# Patient Record
Sex: FEMALE | Race: WHITE | NOT HISPANIC OR LATINO | Employment: FULL TIME | URBAN - METROPOLITAN AREA
[De-identification: names, ages, dates, MRNs, and addresses within clinical notes are randomized per-mention and may not be internally consistent; named-entity substitution may affect disease eponyms.]

---

## 2020-10-21 PROCEDURE — U0003 INFECTIOUS AGENT DETECTION BY NUCLEIC ACID (DNA OR RNA); SEVERE ACUTE RESPIRATORY SYNDROME CORONAVIRUS 2 (SARS-COV-2) (CORONAVIRUS DISEASE [COVID-19]), AMPLIFIED PROBE TECHNIQUE, MAKING USE OF HIGH THROUGHPUT TECHNOLOGIES AS DESCRIBED BY CMS-2020-01-R: HCPCS | Performed by: FAMILY MEDICINE

## 2021-10-29 ENCOUNTER — OFFICE VISIT (OUTPATIENT)
Dept: INTERNAL MEDICINE | Facility: CLINIC | Age: 36
End: 2021-10-29

## 2021-10-29 VITALS
BODY MASS INDEX: 30.62 KG/M2 | DIASTOLIC BLOOD PRESSURE: 76 MMHG | TEMPERATURE: 97.2 F | WEIGHT: 183.8 LBS | HEART RATE: 80 BPM | HEIGHT: 65 IN | OXYGEN SATURATION: 98 % | SYSTOLIC BLOOD PRESSURE: 118 MMHG

## 2021-10-29 DIAGNOSIS — D64.89 ANEMIA DUE TO OTHER CAUSE, NOT CLASSIFIED: ICD-10-CM

## 2021-10-29 DIAGNOSIS — K50.00 CROHN'S DISEASE OF SMALL INTESTINE WITHOUT COMPLICATION (HCC): Primary | ICD-10-CM

## 2021-10-29 DIAGNOSIS — Z84.81 FAMILY HISTORY OF BRCA GENE POSITIVE: ICD-10-CM

## 2021-10-29 DIAGNOSIS — M62.08 DIASTASIS RECTI: ICD-10-CM

## 2021-10-29 PROCEDURE — 99214 OFFICE O/P EST MOD 30 MIN: CPT | Performed by: STUDENT IN AN ORGANIZED HEALTH CARE EDUCATION/TRAINING PROGRAM

## 2021-10-29 RX ORDER — BUSPIRONE HYDROCHLORIDE 5 MG/1
10 TABLET ORAL 2 TIMES DAILY
Qty: 60 TABLET | Refills: 1 | Status: CANCELLED | OUTPATIENT
Start: 2021-10-29

## 2021-10-29 NOTE — PROGRESS NOTES
New Patient Office Visit      Date: 10/29/2021      Patient Name: Maria Luisa Quintero  : 1985   MRN: [id number]     Chief Complaint:    Chief Complaint   Patient presents with   • Establish Care   • Anemia   • Anxiety   • Abdominal Pain       History of Present Illness: Maria Luisa Quintero is a 35 y.o. female who presents to clinic today to establish care.  She gave birth to twin daughters about 15 weeks ago.  Their names are Vita and Omayra.  She had an uncomplicated pregnancy and required a  for delivery.  She developed preeclampsia postpartum that was treated by OB/GYN and was discharged from their clinic.  She had no issues with hypertension prior to pregnancy.  She denies gestational diabetes.  She has following with a pelvic floor physical therapist for diastases recti.  She was told it could take up to a year to resolve and she plans to have corrective surgery at that time.  She has multiple family members who have had breast cancer and that are BRCA positive including her mother and aunts.  Her sister is BRCA positive.  She receives annual mammograms and has an ultrasound scheduled soon since she is breast-feeding.  She has a history of Crohn's disease that is an active.  Her last colonoscopy was about 5 years ago and she has not seen a GI physician recently and is requesting a referral to Dr. Horton [facility] at .  She denies diarrhea, mucus in stool, blood in stool.    Subjective     Review of System: Review of Systems   Constitutional: Negative for fatigue and unexpected weight change.   HENT: Negative for congestion, hearing loss, postnasal drip, rhinorrhea and sore throat.    Eyes: Negative for visual disturbance.   Respiratory: Negative for cough and shortness of breath.    Cardiovascular: Negative for chest pain, palpitations and leg swelling.   Gastrointestinal: Positive for abdominal distention and abdominal pain. Negative for blood in stool, constipation, diarrhea, nausea and  "vomiting.   Endocrine: Negative for cold intolerance, heat intolerance, polydipsia and polyuria.   Genitourinary: Negative for difficulty urinating, dysuria, frequency, hematuria and urgency.   Musculoskeletal: Negative for arthralgias, back pain, joint swelling and myalgias.   Skin: Negative for rash and wound.   Allergic/Immunologic: Negative for environmental allergies.   Neurological: Negative for dizziness, syncope, weakness, numbness and headaches.   Hematological: Does not bruise/bleed easily.   Psychiatric/Behavioral: Negative for agitation, decreased concentration and sleep disturbance. The patient is not nervous/anxious.       I have reviewed the ROS documented by my clinical staff, updated appropriately and I agree. Krista Sims MD    Past Medical History:   Past Medical History:   Diagnosis Date   • Crohn's colitis (HCC)        Past Surgical History:   Past Surgical History:   Procedure Laterality Date   • ABSCESS DRAINAGE     • DENTAL PROCEDURE         Family History: History reviewed. No pertinent family history.    Social History:   Social History     Socioeconomic History   • Marital status: Single   Tobacco Use   • Smoking status: Never Smoker   • Smokeless tobacco: Never Used   Substance and Sexual Activity   • Alcohol use: Never   • Drug use: Never       Medications:     Current Outpatient Medications:   •  prenatal vitamin (prenatal, CLASSIC, vitamin) tablet, Take  by mouth Daily., Disp: , Rfl:     Allergies:   Allergies   Allergen Reactions   • Humira [Adalimumab] Other (See Comments)     Drug induced lupus   • Remicade [Infliximab] Other (See Comments)     Drug induced lupus       Objective     Physical Exam:   Vital Signs:   Vitals:    10/29/21 1348   BP: 118/76   Pulse: 80   Temp: 97.2 °F (36.2 °C)   SpO2: 98%   Weight: 83.4 kg (183 lb 12.8 oz)   Height: 165.1 cm (65\")   PainSc: 0-No pain     Body mass index is 30.59 kg/m².     Physical Exam  Vitals and nursing note reviewed. "   Constitutional:       Appearance: Normal appearance.   HENT:      Head: Normocephalic and atraumatic.   Cardiovascular:      Rate and Rhythm: Normal rate and regular rhythm.      Heart sounds: Normal heart sounds.   Pulmonary:      Effort: Pulmonary effort is normal.      Breath sounds: Normal breath sounds. No wheezing, rhonchi or rales.   Abdominal:      General: Abdomen is flat. Bowel sounds are normal.      Palpations: Abdomen is soft.      Tenderness: There is no abdominal tenderness. There is no guarding.      Hernia: No hernia is present.      Comments: Diastasis recti present   Musculoskeletal:         General: Normal range of motion.   Skin:     General: Skin is warm and dry.   Neurological:      Mental Status: She is alert.   Psychiatric:         Mood and Affect: Mood normal.         Behavior: Behavior normal.         Assessment / Plan      Assessment/Plan:   Diagnoses and all orders for this visit:    1. Crohn's disease of small intestine without complication (HCC) (Primary)  Ambulatory Referral to Gastroenterology. Patient specifically requested Dr. Horton at .  Has not had a colonoscopy in 5 years. Disease has been inactive. CMP and CBC ordered today.     2. Anemia due to other cause, not classified  CBC and ferritin ordered today.  Previously on iron supplementation.     3. Pre-eclampsia, postpartum  Managed by OBGYN postpartum. Discharged from their clinic. Remains normotensive. Will assess renal and liver function today.     4. Family history of BRCA gene positive  Requesting BRCA testing. Ambulatory Referral to Genetics. Breast imaging via ultrasound scheduled soon since she is breast feeding.     5. Diastasis recti  Continue following with pelvic floor physical therapy.     Follow Up:   Return in about 6 months (around 4/29/2022) for Annual.    Time:   I spent approximately 35 minutes providing clinical care for this patient; including review of patient's chart and provider documentation,  face to face time spent with patient in examination room (obtaining history, performing physical exam, discussing diagnosis and management options), placing orders, and completing patient documentation.     2 chronic stable conditions assessed and multiple unique lab tests ordered consistent with moderate MDM.    Krista Sims MD  [facility] [place] [facility]

## 2022-03-07 ENCOUNTER — TRANSCRIBE ORDERS (OUTPATIENT)
Dept: ADMINISTRATIVE | Facility: HOSPITAL | Age: 37
End: 2022-03-07

## 2022-03-07 ENCOUNTER — TELEPHONE (OUTPATIENT)
Dept: INTERNAL MEDICINE | Facility: CLINIC | Age: 37
End: 2022-03-07

## 2022-03-07 DIAGNOSIS — Z12.31 VISIT FOR SCREENING MAMMOGRAM: Primary | ICD-10-CM

## 2022-03-07 NOTE — TELEPHONE ENCOUNTER
Caller: Maria Luisa Quintero    Relationship: Self    Best call back number: [phone]    What orders are you requesting (i.e. lab or imaging):  MAMMOGRAM     In what timeframe would the patient need to come in: 03/07/2022    Where will you receive your lab/imaging services:   [facility]     Additional notes:   PATIENT STATED THAT SHE WOULD LIKE FOR A ORDER FOR A MAMMOGRAM DUE TO BE OVER DUE

## 2022-03-08 DIAGNOSIS — Z12.31 ENCOUNTER FOR SCREENING MAMMOGRAM FOR MALIGNANT NEOPLASM OF BREAST: Primary | ICD-10-CM

## 2022-03-08 NOTE — TELEPHONE ENCOUNTER
Spoke with pt notified her mammogram order has been placed and her needs to call them and get an kimberly. Office number given.

## 2022-04-12 ENCOUNTER — TELEPHONE (OUTPATIENT)
Dept: INTERNAL MEDICINE | Facility: CLINIC | Age: 37
End: 2022-04-12

## 2022-04-12 NOTE — TELEPHONE ENCOUNTER
Spoke with pt's mother if they didn't get any call for making an kimberly they can call them to set an kimberly. Breast Center phone number given.

## 2022-04-12 NOTE — TELEPHONE ENCOUNTER
ANDREWS FROM [facility] IN [place] CALLED STATING PT IS COMING IN Monday, 04/18/2022.      SHE STATED PT NEEDS A SIGNED ORDER, AS SHE IS UNDER 40 AND THEY ARE REQUIRED TO HAVE ONE IN THE CHART.    PLEASE ADVISE.       CALL BACK : [phone]

## 2022-04-12 NOTE — TELEPHONE ENCOUNTER
Hub staff attempted to follow warm transfer process and was unsuccessful     Caller: Maria Luisa Quintero    Relationship to patient: Self    Best call back number:[phone]    Patient is needing: PATIENT RETURNED A CALL FROM THE OFFICE

## 2022-04-12 NOTE — TELEPHONE ENCOUNTER
PT RETURNING MISSED CALL.  PT HAS MAMMO SCHEDULED, BUT IS STILL WAITING FOR THE GENETICS TO CALL AND SCHEDULE.

## 2022-04-14 NOTE — TELEPHONE ENCOUNTER
Returned pt's call; gave pt # to  Genetics; did advise this referral can take up to 6 months to schedule due to large volume of ppl needing to be scheduled.

## 2022-04-15 DIAGNOSIS — Z91.89 AT HIGH RISK FOR BREAST CANCER: Primary | ICD-10-CM

## 2022-04-15 DIAGNOSIS — Z80.3 FAMILY HISTORY OF BREAST CANCER: ICD-10-CM

## 2022-04-15 DIAGNOSIS — Z12.31 ENCOUNTER FOR SCREENING MAMMOGRAM FOR MALIGNANT NEOPLASM OF BREAST: ICD-10-CM

## 2022-04-18 ENCOUNTER — APPOINTMENT (OUTPATIENT)
Dept: OTHER | Facility: HOSPITAL | Age: 37
End: 2022-04-18

## 2022-04-18 ENCOUNTER — HOSPITAL ENCOUNTER (OUTPATIENT)
Dept: MAMMOGRAPHY | Facility: HOSPITAL | Age: 37
Discharge: HOME | End: 2022-04-18
Admitting: STUDENT IN AN ORGANIZED HEALTH CARE EDUCATION/TRAINING PROGRAM

## 2022-04-18 DIAGNOSIS — Z12.31 VISIT FOR SCREENING MAMMOGRAM: ICD-10-CM

## 2022-04-18 DIAGNOSIS — Z92.89 H/O MAMMOGRAM: ICD-10-CM

## 2022-04-18 PROCEDURE — 77067 SCR MAMMO BI INCL CAD: CPT | Performed by: RADIOLOGY

## 2022-04-18 PROCEDURE — 77063 BREAST TOMOSYNTHESIS BI: CPT

## 2022-04-18 PROCEDURE — 77067 SCR MAMMO BI INCL CAD: CPT

## 2022-04-18 PROCEDURE — 77063 BREAST TOMOSYNTHESIS BI: CPT | Performed by: RADIOLOGY

## 2022-05-24 ENCOUNTER — TELEPHONE (OUTPATIENT)
Dept: INTERNAL MEDICINE | Facility: CLINIC | Age: 37
End: 2022-05-24

## 2022-05-24 DIAGNOSIS — Z12.11 COLON CANCER SCREENING: Primary | ICD-10-CM

## 2022-08-04 ENCOUNTER — OFFICE VISIT (OUTPATIENT)
Dept: GASTROENTEROLOGY | Facility: CLINIC | Age: 37
End: 2022-08-04

## 2022-08-04 VITALS
BODY MASS INDEX: 31.09 KG/M2 | WEIGHT: 186.6 LBS | DIASTOLIC BLOOD PRESSURE: 78 MMHG | TEMPERATURE: 97.8 F | HEART RATE: 82 BPM | SYSTOLIC BLOOD PRESSURE: 122 MMHG | OXYGEN SATURATION: 98 % | HEIGHT: 65 IN

## 2022-08-04 DIAGNOSIS — K50.919 CROHN'S DISEASE WITH COMPLICATION, UNSPECIFIED GASTROINTESTINAL TRACT LOCATION: Primary | ICD-10-CM

## 2022-08-04 PROCEDURE — 99204 OFFICE O/P NEW MOD 45 MIN: CPT | Performed by: PHYSICIAN ASSISTANT

## 2022-08-04 NOTE — PROGRESS NOTES
New Patient Consultation     Patient Name: Maria Luisa Quintero  : 1985   MRN: [id number]     Chief Complaint:    Chief Complaint   Patient presents with   • Crohn's Disease       History of Present Illness: Maria Luisa Quintero is a 36 y.o. female, PMH includes significant FHx of BRCA(+), who is here today for a Gastroenterology Consultation for Crohn's disease.     Pt states that she was diagnosed in  due to weight loss, abdominal pain, diarrhea and rectal bleeding. She also had two perirectal abscess that required surgical drainage. She was started on humira and remicade each for about 1 month but developed drug-induced lupus and these were stopped. She has had good response to Asacol in the past. She took a prednisone course for about a week one month ago due to polyarthralgias (hands, shoulder, knees, ankle).     Otherwise, pt states that she is feeling well from a GI standpoint. She generally has 1-2 large, soft BM per day and denies abdominal pain or rectal bleeding. She avoids pork and popcorn, which exacerbate GI sx. Patient denies associated fever, chills, indigestion, nausea, vomiting, diarrhea, constipation, hematemesis, dysphagia, hematochezia, melena, bloating, weight loss or gain, dysuria, jaundice or bruising.    Patient denies personal or FHx of PUD, H Pylori, gastritis, pancreatitis, colitis, Celiac disease, UC, IBS, colon or gastric cancers. Pt denies tobacco, illicit substance use. 2-3 EtOH drinks per week. NSAID few days per week for headaches.     EGD / CSY about 5 years ago at [facility] with Dr. Debbie Kent. Reported mucosal remission of Crohn's disease.     Subjective      Review of Systems:   Review of Systems   Constitutional: Negative for appetite change, chills, diaphoresis, fatigue, fever, unexpected weight gain and unexpected weight loss.   HENT: Negative for drooling, facial swelling, mouth sores, rhinorrhea, sore throat, tinnitus, trouble swallowing and voice change.     Eyes: Negative.    Respiratory: Negative for cough, chest tightness and shortness of breath.    Cardiovascular: Negative for chest pain.   Gastrointestinal: Negative for abdominal pain, blood in stool, constipation, diarrhea, nausea, vomiting, GERD and indigestion.   Genitourinary: Negative for dysuria, flank pain, hematuria and pelvic pain.   Musculoskeletal: Positive for arthralgias. Negative for myalgias.   Skin: Negative for color change, pallor and rash.   Neurological: Negative for dizziness, tremors, syncope, weakness and numbness.   Psychiatric/Behavioral: Negative for hallucinations and sleep disturbance. The patient is not nervous/anxious.    All other systems reviewed and are negative.      Past Medical History:   Past Medical History:   Diagnosis Date   • Crohn's colitis (HCC)        Past Surgical History:   Past Surgical History:   Procedure Laterality Date   • ABSCESS DRAINAGE     • DENTAL PROCEDURE         Family History:   Family History   Problem Relation Age of Onset   • Breast cancer Mother 36        POSITIVE FOR BRCA GENE   • Breast cancer Maternal Aunt 28        DX 2ND TIME; DECSD AGE 32   • Breast cancer Maternal Aunt         DX AGE LATE 40'S   • Breast cancer Maternal Aunt         DX AGE UNKNOWN   • Ovarian cancer Neg Hx        Social History:   Social History     Socioeconomic History   • Marital status: Single   Tobacco Use   • Smoking status: Never Smoker   • Smokeless tobacco: Never Used   Substance and Sexual Activity   • Alcohol use: Never   • Drug use: Never       Alcohol/Tobacco History:   Social History     Substance and Sexual Activity   Alcohol Use Never     Social History     Tobacco Use   Smoking Status Never Smoker   Smokeless Tobacco Never Used       Medications:     Current Outpatient Medications:   •  prenatal vitamin (prenatal, CLASSIC, vitamin) tablet, Take  by mouth Daily., Disp: , Rfl:     Allergies:   Allergies   Allergen Reactions   • Humira [Adalimumab] Other (See  Comments)     Drug induced lupus   • Remicade [Infliximab] Other (See Comments)     Drug induced lupus       Objective     Physical Exam:  Vital Signs: There were no vitals filed for this visit.  There is no height or weight on file to calculate BMI.     Physical Exam  Vitals and nursing note reviewed.   Constitutional:       Appearance: Normal appearance. She is normal weight. She is not ill-appearing or diaphoretic.      Comments: Pleasantly conversant   HENT:      Head: Normocephalic and atraumatic.      Right Ear: External ear normal.      Left Ear: External ear normal.      Nose: Nose normal. No rhinorrhea.      Mouth/Throat:      Mouth: Mucous membranes are moist.      Pharynx: Oropharynx is clear. No posterior oropharyngeal erythema.   Eyes:      General:         Right eye: No discharge.         Left eye: No discharge.      Conjunctiva/sclera: Conjunctivae normal.      Pupils: Pupils are equal, round, and reactive to light.   Neck:      Thyroid: No thyromegaly.   Cardiovascular:      Rate and Rhythm: Normal rate and regular rhythm.      Pulses: Normal pulses.      Heart sounds: Normal heart sounds. No murmur heard.  Pulmonary:      Effort: Pulmonary effort is normal.      Breath sounds: Normal breath sounds. No wheezing.   Abdominal:      General: Abdomen is flat. Bowel sounds are normal. There is no distension.      Tenderness: There is no abdominal tenderness. There is no guarding or rebound.   Musculoskeletal:         General: No tenderness. Normal range of motion.      Cervical back: Normal range of motion and neck supple.      Right lower leg: No edema.      Left lower leg: No edema.   Skin:     General: Skin is warm and dry.      Capillary Refill: Capillary refill takes less than 2 seconds.      Coloration: Skin is not jaundiced.      Findings: No bruising.   Neurological:      General: No focal deficit present.      Mental Status: She is oriented to person, place, and time.      Cranial Nerves: No  cranial nerve deficit.   Psychiatric:         Mood and Affect: Mood normal.         Thought Content: Thought content normal.         Judgment: Judgment normal.         Assessment / Plan      Assessment/Plan:   There are no diagnoses linked to this encounter.     Crohn's disease   - obtain records from Dr. Kent   - obtain CBC, CMP, CRP, vit D, vit B12, fecal calprotectin   - schedule for CSY   - follow up in clinic after completion of above studies   - call clinic at any time for questions or new / worsened sx    Follow Up:   Return in about 6 weeks (around 9/15/2022).    Plan of care reviewed with the patient at the conclusion of today's visit.  Education was provided regarding diagnosis, management, and any prescribed or recommended OTC medications.  Patient verbalized understanding of and agreement with management plan.     Time Statement:   Discussed plan of care in detail with patient today. Patient verbally understands and agrees. I have spent 45 minutes reviewing available diagnostics, obtaining history, examining the patient, developing a treatment plan, and educating the patient on disease process and plan of care.    Violeta Vivar PA-C   [facility]

## 2022-08-26 DIAGNOSIS — Z12.11 COLON CANCER SCREENING: Primary | ICD-10-CM

## 2022-08-26 RX ORDER — SODIUM, POTASSIUM,MAG SULFATES 17.5-3.13G
1 SOLUTION, RECONSTITUTED, ORAL ORAL TAKE AS DIRECTED
Qty: 354 ML | Refills: 0 | Status: SHIPPED | OUTPATIENT
Start: 2022-08-26 | End: 2022-09-23 | Stop reason: SDUPTHER

## 2022-09-13 ENCOUNTER — TELEPHONE (OUTPATIENT)
Dept: GASTROENTEROLOGY | Facility: OTHER | Age: 37
End: 2022-09-13

## 2022-09-13 NOTE — TELEPHONE ENCOUNTER
Patient has acute fever to 101.5 and URI symptoms. Had COVID 6 weeks ago and tested negative today.    >> Advised it is safest to reschedule her elective colonoscopy. Will have the office reach out to her to reschedule for a couple weeks from now.

## 2022-09-15 NOTE — TELEPHONE ENCOUNTER
Can you call this patient and get her procedure re-scheduled, as well as move her follow up with Sallie from 9/28?

## 2022-09-23 DIAGNOSIS — Z12.11 COLON CANCER SCREENING: ICD-10-CM

## 2022-09-23 RX ORDER — SODIUM, POTASSIUM,MAG SULFATES 17.5-3.13G
1 SOLUTION, RECONSTITUTED, ORAL ORAL TAKE AS DIRECTED
Qty: 354 ML | Refills: 0 | Status: SHIPPED | OUTPATIENT
Start: 2022-09-23

## 2022-09-28 ENCOUNTER — OUTSIDE FACILITY SERVICE (OUTPATIENT)
Dept: GASTROENTEROLOGY | Facility: CLINIC | Age: 37
End: 2022-09-28

## 2022-09-28 PROCEDURE — 88305 TISSUE EXAM BY PATHOLOGIST: CPT | Performed by: INTERNAL MEDICINE

## 2022-09-28 PROCEDURE — OUTSIDEPOS PR OUTSIDE POS PLACEHOLDER: Performed by: INTERNAL MEDICINE

## 2022-09-29 ENCOUNTER — LAB REQUISITION (OUTPATIENT)
Dept: LAB | Facility: HOSPITAL | Age: 37
End: 2022-09-29

## 2022-09-29 DIAGNOSIS — K64.8 OTHER HEMORRHOIDS: ICD-10-CM

## 2022-09-29 DIAGNOSIS — K50.919 CROHN'S DISEASE, UNSPECIFIED, WITH UNSPECIFIED COMPLICATIONS: ICD-10-CM

## 2022-09-29 DIAGNOSIS — R10.30 LOWER ABDOMINAL PAIN, UNSPECIFIED: ICD-10-CM

## 2022-09-29 DIAGNOSIS — D12.3 BENIGN NEOPLASM OF TRANSVERSE COLON: ICD-10-CM

## 2022-09-30 LAB
CYTO UR: NORMAL
LAB AP CASE REPORT: NORMAL
LAB AP CLINICAL INFORMATION: NORMAL
PATH REPORT.FINAL DX SPEC: NORMAL
PATH REPORT.GROSS SPEC: NORMAL

## 2022-10-18 ENCOUNTER — TELEPHONE (OUTPATIENT)
Dept: GASTROENTEROLOGY | Facility: CLINIC | Age: 37
End: 2022-10-18

## 2022-10-18 NOTE — TELEPHONE ENCOUNTER
Call 1x lvm to confirm upcoming appt   Patient (s)  given copy of dc instructions and 0 paper script(s) and 3 electronic scripts. Patient (s) verbalized understanding of instructions and script (s). Patient given a current medication reconciliation form and verbalized understanding of their medications. Patient (s) verbalized understanding of the importance of discussing medications with  his or her physician or clinic they will be following up with. Patient alert and oriented and in no acute distress. Patient offered wheelchair from treatment area to hospital entrance, patient denies wheelchair.

## 2023-07-26 ENCOUNTER — OFFICE VISIT (OUTPATIENT)
Dept: INTERNAL MEDICINE | Facility: CLINIC | Age: 38
End: 2023-07-26
Payer: COMMERCIAL

## 2023-07-26 VITALS
HEART RATE: 76 BPM | HEIGHT: 65 IN | OXYGEN SATURATION: 100 % | TEMPERATURE: 97.2 F | BODY MASS INDEX: 31.99 KG/M2 | WEIGHT: 192 LBS | SYSTOLIC BLOOD PRESSURE: 114 MMHG | DIASTOLIC BLOOD PRESSURE: 78 MMHG

## 2023-07-26 DIAGNOSIS — F41.9 ANXIETY: ICD-10-CM

## 2023-07-26 DIAGNOSIS — R63.5 WEIGHT GAIN: ICD-10-CM

## 2023-07-26 DIAGNOSIS — Z76.89 ENCOUNTER TO ESTABLISH CARE: Primary | ICD-10-CM

## 2023-07-26 DIAGNOSIS — K50.00 CROHN'S DISEASE OF SMALL INTESTINE WITHOUT COMPLICATION: ICD-10-CM

## 2023-07-26 DIAGNOSIS — Z13.29 SCREENING FOR HYPOTHYROIDISM: ICD-10-CM

## 2023-07-26 DIAGNOSIS — Z13.220 SCREENING FOR HYPERLIPIDEMIA: ICD-10-CM

## 2023-07-26 DIAGNOSIS — Z80.3 FAMILY HISTORY OF BREAST CANCER IN FEMALE: ICD-10-CM

## 2023-07-26 DIAGNOSIS — Z13.1 SCREENING FOR DIABETES MELLITUS (DM): ICD-10-CM

## 2023-07-26 DIAGNOSIS — J30.2 SEASONAL ALLERGIES: ICD-10-CM

## 2023-07-26 DIAGNOSIS — Z13.21 ENCOUNTER FOR VITAMIN DEFICIENCY SCREENING: ICD-10-CM

## 2023-07-26 DIAGNOSIS — Z80.3 FAMILY HISTORY OF BREAST CANCER IN FIRST DEGREE RELATIVE: ICD-10-CM

## 2023-07-26 RX ORDER — VENLAFAXINE HYDROCHLORIDE 37.5 MG/1
37.5 CAPSULE, EXTENDED RELEASE ORAL DAILY
Qty: 30 CAPSULE | Refills: 2 | Status: SHIPPED | OUTPATIENT
Start: 2023-07-26

## 2023-07-26 RX ORDER — IBUPROFEN 200 MG
TABLET ORAL
COMMUNITY

## 2023-07-26 RX ORDER — LEVOCETIRIZINE DIHYDROCHLORIDE 5 MG/1
TABLET, FILM COATED ORAL
COMMUNITY

## 2023-08-16 ENCOUNTER — OFFICE VISIT (OUTPATIENT)
Dept: INTERNAL MEDICINE | Facility: CLINIC | Age: 38
End: 2023-08-16
Payer: COMMERCIAL

## 2023-08-16 VITALS
WEIGHT: 187 LBS | BODY MASS INDEX: 31.16 KG/M2 | SYSTOLIC BLOOD PRESSURE: 112 MMHG | TEMPERATURE: 97.1 F | OXYGEN SATURATION: 99 % | DIASTOLIC BLOOD PRESSURE: 74 MMHG | HEIGHT: 65 IN | HEART RATE: 77 BPM

## 2023-08-16 DIAGNOSIS — Z09 FOLLOW-UP EXAM: Primary | ICD-10-CM

## 2023-08-16 DIAGNOSIS — F41.9 ANXIETY: ICD-10-CM

## 2023-08-16 DIAGNOSIS — Z80.3 FAMILY HISTORY OF BREAST CANCER: ICD-10-CM

## 2023-08-16 DIAGNOSIS — R63.5 WEIGHT GAIN: ICD-10-CM

## 2023-08-16 DIAGNOSIS — R10.30 LOWER ABDOMINAL PAIN: ICD-10-CM

## 2023-08-16 DIAGNOSIS — R79.89 LOW TSH LEVEL: ICD-10-CM

## 2023-08-16 NOTE — PROGRESS NOTES
Office Note     Name: Maria Luisa Quintero    : 1985     MRN: [id number]     Chief Complaint  Establish Care (Would like routine blood work. ), Anxiety (Would like to discuss starting a medication. ), and Weight Gain    Subjective     History of Present Illness:  Maria Luisa Quintero is a 37 y.o. female who presents today to establish care with a new provider.  Patient had previously been seen by Dr. Harrington in this clinic.  Patient does have Crohn's and follows with GI Dr. Ghosh.  Last colonoscopy was 3 years ago.  She reports she has been in remission for about 12 years now.  She does not currently take any medication for her Crohn's disease.  She had multiple abscesses related to her Crohn's about 13 years ago.  She also has a family history of breast cancer.  She would like to have the BRCA gene testing done.  She has noticed some weight gain over the past 2 weeks.  She feels that she runs and exercises regularly and has been eating more recently.  She also notes increased anxiety after having her twins.  She was previously placed on BuSpar which she did not like secondary to side effects.  She has noticed a lack of interest in activities she normally enjoys.  She denies any tobacco use.  She denies any recreational drug use.  She does use alcohol socially.  She would also like to have routine blood work done.  She denies further complaints or concerns at this time.      Past Medical History:   Diagnosis Date    Allergic     Hayfever    Crohn's colitis        Past Surgical History:   Procedure Laterality Date    ABSCESS DRAINAGE       SECTION  21    COLONOSCOPY  10/2022    DENTAL PROCEDURE         Social History     Socioeconomic History    Marital status: Single   Tobacco Use    Smoking status: Never    Smokeless tobacco: Never   Vaping Use    Vaping Use: Never used   Substance and Sexual Activity    Alcohol use: Yes     Alcohol/week: 3.0 standard drinks     Types: 3 Cans of beer per week  "    Comment: 3 drinks per week    Drug use: Never    Sexual activity: Yes     Partners: Male     Birth control/protection: Condom         Current Outpatient Medications:     ibuprofen (ADVIL,MOTRIN) 200 MG tablet, , Disp: , Rfl:     levocetirizine (XYZAL) 5 MG tablet, , Disp: , Rfl:     Multiple Vitamins-Minerals (MULTIVITAMIN ADULT, MINERALS, PO), , Disp: , Rfl:     Probiotic Product (Probiotic-10) chewable tablet, , Disp: , Rfl:     Pseudoephedrine HCl (SUDAFED 12 HOUR PO), , Disp: , Rfl:     venlafaxine XR (Effexor XR) 37.5 MG 24 hr capsule, Take 1 capsule by mouth Daily., Disp: 30 capsule, Rfl: 2    Objective     Vital Signs  /78   Pulse 76   Temp 97.2 øF (36.2 øC)   Ht 165.1 cm (65\")   Wt 87.1 kg (192 lb)   SpO2 100%   BMI 31.95 kg/mý   Estimated body mass index is 31.95 kg/mý as calculated from the following:    Height as of this encounter: 165.1 cm (65\").    Weight as of this encounter: 87.1 kg (192 lb).    BMI is >= 30 and <35. (Class 1 Obesity). The following options were offered after discussion;: exercise counseling/recommendations and nutrition counseling/recommendations      Physical Exam  Constitutional:       General: She is not in acute distress.     Appearance: Normal appearance. She is not ill-appearing.   HENT:      Head: Normocephalic and atraumatic.      Nose: Nose normal.   Eyes:      Extraocular Movements: Extraocular movements intact.      Conjunctiva/sclera: Conjunctivae normal.      Pupils: Pupils are equal, round, and reactive to light.   Cardiovascular:      Rate and Rhythm: Normal rate and regular rhythm.      Heart sounds: Normal heart sounds.   Pulmonary:      Effort: Pulmonary effort is normal. No respiratory distress.      Breath sounds: Normal breath sounds.   Musculoskeletal:         General: Normal range of motion.      Cervical back: Neck supple.   Skin:     General: Skin is warm and dry.   Neurological:      General: No focal deficit present.      Mental Status: She " is alert and oriented to person, place, and time. Mental status is at baseline.   Psychiatric:         Mood and Affect: Mood normal.         Behavior: Behavior normal.         Thought Content: Thought content normal.         Judgment: Judgment normal.        Assessment and Plan     Diagnoses and all orders for this visit:    1. Encounter to establish care (Primary)  -     CBC (No Diff); Future  -     Comprehensive metabolic panel; Future    2. Crohn's disease of small intestine without complication  -     CBC (No Diff); Future  -     Comprehensive metabolic panel; Future    3. Anxiety  -     venlafaxine XR (Effexor XR) 37.5 MG 24 hr capsule; Take 1 capsule by mouth Daily.  Dispense: 30 capsule; Refill: 2    4. Weight gain  -     TSH Rfx On Abnormal To Free T4; Future    5. BMI 32.0-32.9,adult    6. Seasonal allergies    7. Family history of breast cancer in female  -     BRCA1 Targeted Analysis - Blood,; Future  -     BRCA2 Targeted Analysis - Blood,; Future    8. Family history of breast cancer in first degree relative  -     BRCA1 Targeted Analysis - Blood,; Future  -     BRCA2 Targeted Analysis - Blood,; Future    9. Screening for hyperlipidemia  -     Lipid Panel; Future    10. Screening for hypothyroidism  -     TSH Rfx On Abnormal To Free T4; Future    11. Vitamin deficiency screen  -     Vitamin D,25-Hydroxy; Future  -     Vitamin B12; Future    12. Screening for diabetes mellitus (DM)  -     Urinalysis With Culture If Indicated -; Future  -     Hemoglobin A1c; Future    Plan:  Establish care.  Will trial Effexor.  Wellbutrin may also be an alternative to consider if intolerant to Effexor.  Lab orders placed.  Return to clinic in about 3 weeks for follow-up on anxiety and medication efficacy.    We will also review lab results at that time.  Return to clinic sooner if needed.  Further plan of care based on lab result findings.    Follow Up  Return in about 3 weeks (around 8/16/2023) for Recheck.    Karena  NICOLE Starks    Part of this note may be an electronic transcription/translation of spoken language to printed text using the Dragon Dictation System.

## 2023-08-29 DIAGNOSIS — Z80.3 FAMILY HISTORY OF BREAST CANCER IN FEMALE: Primary | ICD-10-CM

## 2023-08-29 DIAGNOSIS — Z80.3 FAMILY HISTORY OF BREAST CANCER IN FIRST DEGREE RELATIVE: ICD-10-CM

## 2023-09-02 PROBLEM — Z80.3 FAMILY HISTORY OF BREAST CANCER: Status: ACTIVE | Noted: 2023-09-02

## 2023-09-02 PROBLEM — R63.5 WEIGHT GAIN: Status: ACTIVE | Noted: 2023-09-02

## 2023-09-02 PROBLEM — F41.9 ANXIETY: Status: ACTIVE | Noted: 2023-09-02

## 2023-09-02 PROBLEM — R79.89 LOW TSH LEVEL: Status: ACTIVE | Noted: 2023-09-02

## 2023-09-02 NOTE — PROGRESS NOTES
Office Note     Name: Maria Luisa Quintero    : 1985     MRN: [id number]     Chief Complaint  Anxiety    Subjective     History of Present Illness:  Maria Luisa Quintero is a 37 y.o. female who presents today for follow-up visit on anxiety.  Patient is also here today for lab review.  Patient was started on Effexor 37.5 mg daily at the previous visit.  Patient reports she is tolerating this medication well.  She initially experienced a headache soon after starting the medication but feels this has been less frequent recently.  TSH level is low at 0.39, T4 level 0.9.  She reports TSH level was 0.54 back in 2019.  Hemoglobin was also noted to be low at 11.2.  Genetic testing for the BRCA gene pending.  Patient is interested in genetic counseling if positive.  She is considering a possible prophylactic mastectomy.  Patient does feel like the Effexor is working and she is tolerating this well.  She would like to stay on the current dose of this medication for now.  Patient also complains of some low abdominal pain and discomfort along with bloating.  She denies further complaints or concerns at this time.  Pleasant visit with the patient today.      Past Medical History:   Diagnosis Date    Allergic     Hayfever    Crohn's colitis        Past Surgical History:   Procedure Laterality Date    ABSCESS DRAINAGE       SECTION  21    COLONOSCOPY  10/2022    DENTAL PROCEDURE         Social History     Socioeconomic History    Marital status:    Tobacco Use    Smoking status: Never    Smokeless tobacco: Never   Vaping Use    Vaping Use: Never used   Substance and Sexual Activity    Alcohol use: Yes     Alcohol/week: 3.0 standard drinks     Types: 3 Cans of beer per week     Comment: 3 drinks per week    Drug use: Never    Sexual activity: Yes     Partners: Male     Birth control/protection: Condom         Current Outpatient Medications:     ibuprofen (ADVIL,MOTRIN) 200 MG tablet, , Disp: , Rfl:      "levocetirizine (XYZAL) 5 MG tablet, , Disp: , Rfl:     Multiple Vitamins-Minerals (MULTIVITAMIN ADULT, MINERALS, PO), , Disp: , Rfl:     Probiotic Product (Probiotic-10) chewable tablet, , Disp: , Rfl:     Pseudoephedrine HCl (SUDAFED 12 HOUR PO), , Disp: , Rfl:     venlafaxine XR (Effexor XR) 37.5 MG 24 hr capsule, Take 1 capsule by mouth Daily., Disp: 30 capsule, Rfl: 2    Objective     Vital Signs  /74   Pulse 77   Temp 97.1 øF (36.2 øC)   Ht 165.1 cm (65\")   Wt 84.8 kg (187 lb)   SpO2 99%   BMI 31.12 kg/mý   Estimated body mass index is 31.12 kg/mý as calculated from the following:    Height as of this encounter: 165.1 cm (65\").    Weight as of this encounter: 84.8 kg (187 lb).           Physical Exam  Constitutional:       General: She is not in acute distress.     Appearance: Normal appearance. She is not ill-appearing.   HENT:      Head: Normocephalic and atraumatic.      Nose: Nose normal.   Eyes:      Extraocular Movements: Extraocular movements intact.      Conjunctiva/sclera: Conjunctivae normal.      Pupils: Pupils are equal, round, and reactive to light.   Cardiovascular:      Rate and Rhythm: Normal rate and regular rhythm.      Heart sounds: Normal heart sounds.   Pulmonary:      Effort: Pulmonary effort is normal. No respiratory distress.      Breath sounds: Normal breath sounds.   Musculoskeletal:         General: Normal range of motion.      Cervical back: Neck supple.   Skin:     General: Skin is warm and dry.   Neurological:      General: No focal deficit present.      Mental Status: She is alert and oriented to person, place, and time. Mental status is at baseline.   Psychiatric:         Mood and Affect: Mood normal.         Behavior: Behavior normal.         Thought Content: Thought content normal.         Judgment: Judgment normal.        Assessment and Plan     Diagnoses and all orders for this visit:    1. Follow-up exam (Primary)    2. Low TSH level  -     TSH; Future    3. " Lower abdominal pain  -     US abdomen limited; Future    4. Anxiety    5. Weight gain    6. BMI 31.0-31.9,adult    7. Family history of breast cancer    Plan:  Continue Effexor 37.5 mg daily.  Try taking after work around 430 to 5 PM.  TSH level low.  Plan to recheck level in 6 to 8 weeks.  Ultrasound of low abdomen ordered.  BRCA gene testing pending.  May referred to genetic counseling pending lab results.  Continue working on dietary modifications.  Plan to follow-up in about 8 weeks.  Return to clinic sooner if needed.    Follow Up  Return in about 2 months (around 10/16/2023) for Recheck.    NICOLE Fernandez    Part of this note may be an electronic transcription/translation of spoken language to printed text using the Dragon Dictation System.

## 2023-09-12 ENCOUNTER — TELEMEDICINE (OUTPATIENT)
Dept: INTERNAL MEDICINE | Facility: CLINIC | Age: 38
End: 2023-09-12
Payer: COMMERCIAL

## 2023-09-12 DIAGNOSIS — F41.9 ANXIETY: Primary | ICD-10-CM

## 2023-09-12 RX ORDER — BUPROPION HYDROCHLORIDE 150 MG/1
150 TABLET ORAL DAILY
Qty: 30 TABLET | Refills: 1 | Status: SHIPPED | OUTPATIENT
Start: 2023-09-12

## 2023-09-27 ENCOUNTER — HOSPITAL ENCOUNTER (OUTPATIENT)
Dept: ULTRASOUND IMAGING | Facility: HOSPITAL | Age: 38
Discharge: HOME | End: 2023-09-27
Admitting: NURSE PRACTITIONER
Payer: COMMERCIAL

## 2023-09-27 DIAGNOSIS — R10.30 LOWER ABDOMINAL PAIN: ICD-10-CM

## 2023-09-27 PROCEDURE — 76705 ECHO EXAM OF ABDOMEN: CPT

## 2023-10-03 NOTE — PROGRESS NOTES
Telehealth Visit     Date: 2023   Patient Name: Maria Luisa Quintero  : 1985   MRN: [id number]     Chief Complaint:    Chief Complaint   Patient presents with    Anxiety       This provider is located at the [facility] in [place], [place]. The patient is being seen remotely via telehealth at their home address in [place], and stated they are in a secure environment for this session. The patient's condition being diagnosed/treated is appropriate for telemedicine. The provider identified herself as well as her credentials. The patient, and/or patients guardian, consent to be seen remotely, and when consent is given they understand that the consent allows for patient identifiable information to be sent to a third party as needed. They may refuse to be seen remotely at any time. The electronic data is encrypted and password protected, and the patient and/or guardian has been advised of the potential risks to privacy not withstanding such measures.    You have chosen to receive care through a telehealth visit. Do you consent to use a video/audio connection for your medical care today? Yes    History of Present Illness: Maria Luisa Quintero is a 37 y.o. female who is here today to follow-up on anxiety.  She was previously started on Effexor 37.5 mg daily for management of anxiety symptoms.  She tried the Effexor for 1 week and did not feel that she tolerated this medication well.  She felt that it caused her to have a headache as well as increased drowsiness.  She weaned herself off of this medication.  She had also taken BuSpar in the past which she did not tolerate well.  She is concerned about her anxiety and would like to try different medication to help assist with symptoms.  She denies further complaints or concerns at this time.  She presents today for evaluation of the above complaints.  Had a pleasant visit with the patient today.      Subjective      Review of Systems    Neurological:  Positive for headaches.   Psychiatric/Behavioral:  The patient is nervous/anxious.      I have reviewed and the following portions of the patient's history were updated as appropriate: past family history, past medical history, past social history, past surgical history and problem list.    Past Medical History:   Diagnosis Date    Allergic     Hayfever    Crohn's colitis        Past Surgical History:   Procedure Laterality Date    ABSCESS DRAINAGE       SECTION  21    COLONOSCOPY  10/2022    DENTAL PROCEDURE         Social History     Socioeconomic History    Marital status:    Tobacco Use    Smoking status: Never    Smokeless tobacco: Never   Vaping Use    Vaping Use: Never used   Substance and Sexual Activity    Alcohol use: Yes     Alcohol/week: 3.0 standard drinks     Types: 3 Cans of beer per week     Comment: 3 drinks per week    Drug use: Never    Sexual activity: Yes     Partners: Male     Birth control/protection: Condom         Current Outpatient Medications:     buPROPion XL (Wellbutrin XL) 150 MG 24 hr tablet, Take 1 tablet by mouth Daily., Disp: 30 tablet, Rfl: 1    ibuprofen (ADVIL,MOTRIN) 200 MG tablet, , Disp: , Rfl:     levocetirizine (XYZAL) 5 MG tablet, , Disp: , Rfl:     Multiple Vitamins-Minerals (MULTIVITAMIN ADULT, MINERALS, PO), , Disp: , Rfl:     Probiotic Product (Probiotic-10) chewable tablet, , Disp: , Rfl:     Pseudoephedrine HCl (SUDAFED 12 HOUR PO), , Disp: , Rfl:     Objective     Physical Exam:  Vital Signs: There were no vitals filed for this visit.  There is no height or weight on file to calculate BMI.    Physical Exam  Constitutional:       Appearance: Normal appearance.   HENT:      Head: Normocephalic and atraumatic.      Nose: Nose normal.   Eyes:      Extraocular Movements: Extraocular movements intact.   Pulmonary:      Effort: Pulmonary effort is normal. No respiratory distress.   Musculoskeletal:      Cervical back: Neck supple.    Neurological:      General: No focal deficit present.      Mental Status: She is alert and oriented to person, place, and time. Mental status is at baseline.   Psychiatric:         Mood and Affect: Mood normal.         Behavior: Behavior normal.         Thought Content: Thought content normal.         Judgment: Judgment normal.       Assessment / Plan      Diagnoses and all orders for this visit:    1. Anxiety (Primary)  -     buPROPion XL (Wellbutrin XL) 150 MG 24 hr tablet; Take 1 tablet by mouth Daily.  Dispense: 30 tablet; Refill: 1         Follow Up:   Return in about 3 weeks (around 10/3/2023).    Any medications prescribed have been sent electronically to   [id number] - [place], [place] - [address] [facility] [phone]  - [phone] FX  [address] [address]  Phone: [phone] Fax: [phone]      25 minutes were spent reviewing the patient's questionnaire, formulating a treatment plan, and relaying information to the patient via [employer].    NICOLE Fernandez    Part of this note may be an electronic transcription/translation of spoken language to printed text using the Dragon Dictation System.

## 2023-10-09 ENCOUNTER — TELEPHONE (OUTPATIENT)
Dept: GENETICS | Facility: HOSPITAL | Age: 38
End: 2023-10-09
Payer: COMMERCIAL

## 2023-10-09 NOTE — TELEPHONE ENCOUNTER
Called pt to remind them of their upcoming genetic counseling appt. Left message asking pt to call me back to review family history prior to appt.

## 2023-10-24 ENCOUNTER — CLINICAL SUPPORT (OUTPATIENT)
Dept: GENETICS | Facility: HOSPITAL | Age: 38
End: 2023-10-24
Payer: COMMERCIAL

## 2023-10-24 ENCOUNTER — LAB (OUTPATIENT)
Dept: LAB | Facility: HOSPITAL | Age: 38
End: 2023-10-24
Payer: COMMERCIAL

## 2023-10-24 DIAGNOSIS — Z80.3 FAMILY HISTORY OF MALIGNANT NEOPLASM OF BREAST: ICD-10-CM

## 2023-10-24 DIAGNOSIS — Z84.81 FAMILY HISTORY OF BRCA1 GENE POSITIVE: ICD-10-CM

## 2023-10-24 DIAGNOSIS — Z80.0 FAMILY HISTORY OF MALIGNANT NEOPLASM OF GASTROINTESTINAL TRACT: ICD-10-CM

## 2023-10-24 DIAGNOSIS — Z13.79 GENETIC TESTING: Primary | ICD-10-CM

## 2023-10-24 PROCEDURE — 96040: CPT

## 2023-10-25 NOTE — PROGRESS NOTES
Date of Visit: 10/24/2023   Patient Name: Maria Luisa Quintero  : 1985   MRN: [id number]     Referring Provider: Karena Starks APRN    REASON FOR CONSULT  Maria Luisa Quintero is a 37 y.o. female referred for genetic counseling due to her recent genetic testing of the BRCA1/2 genes that resulted with a BRCA2 variant of uncertain significance (VUS), a family history of breast cancer, and a known maternal familial BRCA1 mutation.  Ms. Quintero does not have a personal history of cancer.  Ms. Quintero started menarche at age 11 and had her first child at age 35.  She is premenopausal and never used HRT.  Her last mammogram described her breast tissue as heterogeneously dense and has no history of breast biopsies.  Her ovaries and uterus are intact.  Due to her diagnosis of Crohn's disease she is currently undergoing colonoscopy screenings every 3 years.  She reported to only have one or two polyps removed since beginning her colonoscopy screenings.  Due to her mother being a known carrier of a BRCA1 mutation, Ms. Quintero pursued genetic testing of only the BRCA1/2 genes through [facility] in 2023.  This test was negative for the known familial BRCA1 mutation.  It did identify a BRCA2 VUS.   Ms. Quintero was interested in discussing her risk for a hereditary cancer syndrome and genetic testing for cancer susceptibility.    PERTINENT FAMILY HISTORY:  A cancer-focused four-generation pedigree was obtained via patient reporting    Sister - unaffected, reported to be BRCA1 (+)  Father - adopted, no family history information  Mother - triple negative breast cancer, 36          - confirmed BRCA1 (+) carrier (c.5255G>A) [id number] [ClinVar: [id number](BRCA1):c.5135G>A ([id number])]  Maternal Aunt 1 - breast cancer, 28 and 32  Maternal Aunt 2 - breast cancer, 40's             - thyroid cancer, 60's  Maternal Aunt 3 - breast cancer, 50's  Maternal Female 1st Cousin 1 - unaffected, reported to be BRCA1 (+)  Maternal Female 1st  Cousin 2 - unaffected, reported to be BRCA1 (+)  Maternal Female 1st Cousin 3 - unaffected, reported to be BRCA1 (+)  Maternal Grandfather - colon cancer, 80's  Maternal Grandmother - breast cancer, 60             - pancreatic cancer, 62    RISK ASSESSMENT  Ms. Quintero's reported family history is suggestive of hereditary cancer risk.  Ms. Quintero meets NCCN guidelines criteria for genetic testing based on a known maternal BRCA1 mutation in the family, a first and second degree relative diagnosed with breast cancer before the age of 50, first degree relative diagnosed with triple negative breast cancer, and there exists more than 3 breast cancer diagnoses on her maternal family history.  Despite her previous genetic test not resulting with the known familial BRCA1 (c.5255G>A) mutation, there still exists the potential for Ms. Quintero to harbor a mutation in another cancer risk gene.  It was confirmed that her mother's test was a single site test performed by [facility] in 2013 analyzing only for the known BRCA1 mutation.  Since 2013 breast cancer risk gene panels have expanded to include many more genes other than BRCA1/2 and these should be evaluated as well, and new testing technologies and methodologies have emerged since then.  There is the possibility that there is another familial mutation on her maternal side that has not been tested for, and there is limited paternal family history inofrmation to adequately determine risk due to her father's adopted status.  Comprehensive genetic testing for all breast and common hereditary genetic conditions is warranted considering these limitations in the family history and genetic testing history.    A significant proportion (>50%) of hereditary breast and ovarian cancer can be attributed to mutations in the BRCA1 and BRCA2 genes.  Females with mutations in BRCA1/2  can have a lifetime breast cancer risk up to 60 - 84%, while the general population risk for breast cancer  is 12 - 13%.  BRCA1 mutations can also increase the lifetime risk for ovarian cancer up to 58% and BRCA2 mutations can increase this risk up to 29%.  The general population lifetime ovarian cancer risk is 1 - 2%.  BRCA1/2 mutations can also significantly increase the lifetime risk of certain cancers in males such as prostate cancer.  Mutations in these two genes can also increase the risk for pancreatic cancer and male breast cancer.  There are other clinically significant cancer related genes, as well as other, more rare, hereditary cancer syndromes than can increase risk for breast and ovarian cancers. The degree of risk and screening recommendations vary by gene, the individual's age, and related family history.    GENETIC COUNSELING  We reviewed the family history information in detail. Cases of cancer follow three general patterns: sporadic, familial, and hereditary.  While most cancer cases are sporadic (~70% of cancer cases), some cases appear to occur in family clusters.  These cases are said to be familial and account for around 20% of cancer cases.  Familial cases may be due to a combination of shared genes and environmental factors among family members that we cannot evaluate via genetic testing at this time.  In 5% - 10% of cancer cases, the risk for cancer is inherited, and the genes responsible for the increased cancer risk are known.      Family histories typical of hereditary cancer syndromes usually include multiple first- and second-degree relatives diagnosed with cancer types that define a syndrome.  These cases tend to be diagnosed at younger-than-expected ages and can be bilateral or multifocal.  The cancer in these families follows an autosomal dominant inheritance pattern, which indicates the likely presence of a mutation in a cancer gene.  Children and siblings of an individual carrying a mutation have a 50% chance of inheriting that mutation, thereby inheriting the increased risk to develop  "cancer.  However, it is not recommended to pursue genetic testing for adult-onset cancers in children as the results would not have clinical utility until some time in adulthood among other ethical reasons.  These mutations can be passed down from the maternal or the paternal lineage.    We discussed that risk factors or \"red flags\" for hereditary risk include cancers diagnosed at earlier-than-average ages, multiple family members diagnosed with cancers associated with mutations in the same gene, or multiple generations of associated cancers. Dependent on the specific cancer type or syndrome, there exists the potential for several clinically significant genes related to the cancer's onset or increased risk for development.  Therefore, the standard approach to hereditary cancer genetic testing at this time is via multi-gene panel analyzing several genes associated with a hereditary risk for cancer.  Once results are completed, we will review them in the context of the patient's personal and family history to determine what, if any, post-test cancer risk management changes are recommended.  When affected relatives are unavailable or unwilling to pursue genetic testing, it is reasonable to offer genetic testing to an unaffected individual.      GENETIC TESTING  The risks, benefits, and limitations of genetic testing and implications for clinical management following testing were reviewed.  Genetic test results can influence decisions regarding screening and prevention.  Genetic testing can have significant psychological implications for both individuals and families. Also discussed was the possibility of insurance discrimination based on genetic test results and the laws in place to prevent this, as well as the limitations of these laws.      The Genetic Information Nondiscrimination Act (CHANTAL) is a federal law enacted in May 2008.  CHANTAL prohibits discrimination on the basis of genetic information such as genetic test " "results with respect to health insurance and employment status.  Under Title 1 of CHANTAL, health insurance companies are prohibited from using an individual's genetic information to change premiums, drop or change an individual's coverage, or prevent coverage from being acquired.  Title 2 of CHANTAL prohibits employers from using genetic information in employment decisions such as, but not limited to, hiring, firing, promotions, pay, and job assignments.  CHANTAL protections do not protect against genetic discrimination by life insurance, long-term care or disability insurance,  service members, federal employees, those using the [facility], or those employed by a small business with fewer than 15 employees.    We discussed panel testing, which could involve testing numerous genes associated with increased cancer risk. The implications of a positive, negative, or VUS test result were discussed.  We also discussed the importance of testing an affected relative. In general, a negative genetic test result is most informative if a mutation has first been established in an affected member of the family.  In cases where an affected individual is not available or interested in testing, it is appropriate to offer testing to an unaffected individual.     With multigene panel testing, it is not uncommon for a variant of uncertain significance (VUS) to be identified.  Variants are termed \"VUS\" when there is not sufficient evidence to classify the variant as a negative (not harmful) variant or a postive (harmful) mutation. Genetic testing labs work to reclassify all VUSs overtime and will issue an updated report when a reclassification occurs.  The majority (over 90%) of VUSs that are reclassified are found to be negative genetic variants, however a small percentage will be upgraded to a harmful mutation. Clinically, a VUS is treated as a negative result.  If genetic testing is negative or a variant of uncertain " significance (VUS) is found, management should be guided by a family history-based risk assessment.  Medical care should not be altered based on a VUS result.  Genetic testing for other unaffected (never had cancer) relatives is not recommended for a VUS.    We discussed that there are limitations to testing an individual who has not had cancer.  A negative test result does not mean Ms. Underwoods risk for cancer is low or even normal, although the risk would not be as high as it would with positive (harmful mutation) result.  It could still be increased over the general population based on family history alone.  If Ms. Quintero tests negative it could be for several different reasons such as:    The possibility that the known familial BRCA1 mutation is the only pathogenic germline mutation at risk in the family and Ms. Quintero did not inherit it.  Ms. Quintero could have a pathogenic variant in one of the genes tested for that was not detected. Current testing will identify the overwhelming majority of pathogenic variants, but some are not detectable with current technology.   Ms. Quintero may carry a pathogenic variant in another gene associated with hereditary cancer predisposition that was not tested for, or the risk in the family may be due to other genetic factors that are not well understood.    ASSESSMENT AND PLAN  Ms. Quintero meets NCCN guidelines for genetic testing.  Her BRCA2 VUS result should be considered a negative result and not affect her medical management unless upgraded to a positive (mutation) result in the future.     Reviewed the options for genetic testing including a multi-gene panel of high risk genes, a panel of genes with known management guidelines, or a broader approach including more newly discovered genes. Reviewed the benefits and drawbacks of this approach, including finding mutations in genes that are less well understood, or results that are unexpected based on this family history.    Ms. Quintero  elected to pursue genetic testing.  [facility] panel was ordered, which analyzes 36 genes associated with an increased cancer risk. The genes on this panel include APC, MANDO, AXIN2, BARD1, BMPR1A, BRCA1, BRCA2, BRIP1, CDH1, CDK4, CDKN2A, CHEK2, DICER1, EPCAM, GREM1, HOXB13, MLH1, MSH2, MSH3, MSH6, MUTYH, NBN, NF1, NTHL1, PALB2, PMS2, POLD1, POLE, PTEN, RAD51C, RAD51D, RECQL, SMAD4, SMARCA4, STK11, and TP53.      A blood sample for genetic evaluation was collected on 10/24/2023.      Genetic testing results are expected in 2 - 4 weeks from 10/24/2023.  We will contact the patient when results are received.    Ms. Quintero asked excellent questions during the consult and did not demonstrate any acute psychosocial distress during our visit. This clinic encounter was 1 hour.      Bonifacio Castro MS  Genetic Counselor  [facility]

## 2023-11-02 DIAGNOSIS — F41.9 ANXIETY: ICD-10-CM

## 2023-11-02 RX ORDER — BUPROPION HYDROCHLORIDE 150 MG/1
150 TABLET ORAL DAILY
Qty: 30 TABLET | Refills: 1 | OUTPATIENT
Start: 2023-11-02

## 2023-11-03 DIAGNOSIS — F41.9 ANXIETY: ICD-10-CM

## 2023-11-03 RX ORDER — BUPROPION HYDROCHLORIDE 150 MG/1
150 TABLET ORAL DAILY
Qty: 30 TABLET | Refills: 0 | Status: SHIPPED | OUTPATIENT
Start: 2023-11-03

## 2023-11-16 ENCOUNTER — TELEPHONE (OUTPATIENT)
Dept: GASTROENTEROLOGY | Facility: CLINIC | Age: 38
End: 2023-11-16
Payer: COMMERCIAL

## 2023-11-16 NOTE — TELEPHONE ENCOUNTER
Patient says she's has stomach pains for 3 days and thinks she's having a chrohn's flare. She said she's been in remission for years but wants any recommendations that you have.

## 2023-11-16 NOTE — TELEPHONE ENCOUNTER
Please schedule office follow up with me. Recommend prompt evaluation at ER for worsened sx including pain, fever, etc in the meantime.

## 2023-11-20 ENCOUNTER — TELEPHONE (OUTPATIENT)
Dept: GENETICS | Facility: HOSPITAL | Age: 38
End: 2023-11-20
Payer: COMMERCIAL

## 2023-11-22 ENCOUNTER — TELEPHONE (OUTPATIENT)
Dept: GENETICS | Facility: HOSPITAL | Age: 38
End: 2023-11-22
Payer: COMMERCIAL

## 2023-11-22 NOTE — TELEPHONE ENCOUNTER
2nd attempt to disclose genetic test results.  Missed a return call from the patient just before this 2nd attempt.  VM left

## 2023-12-01 ENCOUNTER — DOCUMENTATION (OUTPATIENT)
Dept: GENETICS | Facility: HOSPITAL | Age: 38
End: 2023-12-01
Payer: COMMERCIAL

## 2023-12-01 NOTE — PROGRESS NOTES
Date of Visit: 2023  Name: Maria Luisa Quintero  : 1985  MRN: [id number]    Referring Provider: Karena Starks APRN        REASON FOR CONSULT  Maria Luisa Quintero is a 37 y.o. female referred for genetic counseling due to a previous genetic test of the BRCA1/2 genes that found a BRCA2 variant of uncertain significance (VUS), a family history of breast cancer, and a known maternal familial BRCA1 mutation.  Ms. Quintero does not have a personal history of cancer.  Ms. Quintero started menarche at age 11 and had her first child at age 35.  She is premenopausal and never used HRT.  Her last mammogram described her breast tissue as heterogeneously dense and has no history of breast biopsies.  Her ovaries and uterus are intact.  Due to her diagnosis of Crohn's disease she is currently undergoing colonoscopy screenings every 3 years.  She reported to only have one or two polyps removed since beginning her colonoscopy screenings.  Due to her mother being a known carrier of a BRCA1 mutation, Ms. Quintero pursued genetic testing of only the BRCA1/2 genes through [facility] in 2023.  This test was negative for the known familial BRCA1 mutation.  It did identify a BRCA2 VUS.   Ms. Quintero was interested in discussing her risk for a hereditary cancer syndrome and genetic testing for cancer susceptibility.   Due to panel testing for breast cancer risk genes expanding since Ms. Araujo's mother underwent testing and the limited paternal family history of cancer  due to her father being adopted when he was young, panel testing was considered appropriate and was offered to Ms. Quintero.  Ms. Quintero elected to pursue genetic testing via [facility] 36-gene panel with [facility].  Ms. Quintero's genetic test was negative for pathogenic mutations in all 36 - cancer risk genes analyzed.  Ms. Quintero's genetic test result found a variant of uncertain significance (VUS) in the gene BRCA2.  This is the same BRCA2 VUS found in her  previous genetic test through [facility].  Ms. Quintero estimated lifetime breast cancer risk is 28.1% calculated by the Tyrer-Cuzick model.  Ms. Quintero expressed her desire for a referral to the [facility] at [facility] to manage her breast cancer screenings.  Ms. Quintero was notified of these results via telephone on 11/22/2023.     PERTINENT FAMILY HISTORY   A cancer-focused four-generation pedigree was obtained via patient reporting     Sister - unaffected, reported to be BRCA1 (+)  Father - adopted, no family history information  Mother - triple negative breast cancer, 36                     - confirmed BRCA1 (+) carrier (c.5255G>A) P1658L [ClinVar: [id number](BRCA1):c.5135G>A ([id number])]  Maternal Aunt 1 - breast cancer, 28 and 32  Maternal Aunt 2 - breast cancer, 40's                                   - thyroid cancer, 60's  Maternal Aunt 3 - breast cancer, 50's  Maternal Female 1st Cousin 1 - unaffected, reported to be BRCA1 (+)  Maternal Female 1st Cousin 2 - unaffected, reported to be BRCA1 (+)  Maternal Female 1st Cousin 3 - unaffected, reported to be BRCA1 (+)  Maternal Grandfather - colon cancer, 80's  Maternal Grandmother - breast cancer, 60                                              - pancreatic cancer, 62    GENETIC TESTING RESULTS AND RECOMMENDATIONS  Genetic testing was performed by [facility] analyzing 36 - cancer-risk genes.    No pathogenic mutations were detected by sequencing, rearrangement testing, and RNA analysis for the known familial BRCA1 mutation or in the 35 other genes on the CancerNext panel.  Genetic testing found a VUS in the BRCA2 (c.4357A>G) gene.  With multigene panel testing, it is not uncommon for a variant of uncertain significance (VUS) to be identified.  Variants are termed VUS when there is not sufficient evidence to classify the variant as a negative (not harmful) variant or a positive (harmful) mutation. Genetic  testing labs work to reclassify all VUSs overtime and issue an updated report when a reclassification occurs.  The majority (over 90%) of VUSs that are reclassified are found to be negative genetic variants, however, a small percentage (~9%) will reclassified as a harmful mutation. Clinically, a VUS is treated as a negative result.  Medical care should not be altered solely based on a VUS result.  Medical care should be guided by a family history-based risk assessment.  Genetic testing for other unaffected (never had cancer) relatives is not recommended solely for a VUS.  Most cancer cases (~70%) are classified as sporadic in nature.  This result does not clarify the cause of Ms. Quintero's family history of cancer.  This result does tell us that Ms. Underwoods risk for cancer is likely not as high as seen in individuals with mutations in cancer genes. Increased cancer risks for Ms. Quintero and her family may remain due to her family history of cancer.    The Tyrer-Cuzick model (EDMOND) is a validated female breast cancer risk tool that assesses an unaffected (has not had breast cancer) female's lifetime breast cancer risk.  Tyrer-Cuzick version v8.0b estimates Ms. Underwoods lifetime breast cancer risk is 28.1%.  It should be noted that this estimate may be higher than expected.  Since her mother is a known BRCA1 mutation carrier and Ms. Quintero is negative for this specific mutation she is considered a true negative.  Individuals found to be true negatives are traditionally considered to be at average population risk for the cancers in question as they did not inherit what is considered to be the cause of the family history of cancer.  However, given this estimate, the National Comprehensive Cancer Network guidelines (version 3.2023) recommend the following for females age 38 who have a greater than 20% lifetime risk of breast cancer calculated from validated models such as the Tyrer-Cuzick model:     Clinical breast exam every  6-12 months and consider a referral to a breast specialist as appropriate  Annual screening mammogram with tomosynthesis to begin 10 years prior to the youngest diagnosis of breast cancer in a family member, but not prior to age 30 or begin at 40, whichever comes first  The youngest breast cancer in the family was diagnosed in her maternal aunt at age 28.  Ms. Quintero could have begun this screening as early as 30.  Annual breast MRI with and without contrast to begin 10 years prior to the youngest diagnosis of breast cancer in a family member, but not prior to age 25 or begin at 40, whichever comes first  Consider contrast-enhanced mammography (HECTOR) or molecular breast imaging (MBI) for those that qualify for, but cannot undergo MRI  Whole breast ultrasound may be done if contrast-enhanced imaging or functional imaging is not available/accessible  The youngest breast cancer in the family was diagnosed in her maternal aunt at age 28.  Ms. Quintero could have begun this screening as early as 25.  Consider risk reduction strategies such as risk reducing medications  Practice breast awareness     We encourage patients to reach out over time to inquire about any new methodologies of testing or newly identified cancer risk genes that could explain their personal or family history of cancer.    GENETIC COUNSELING  We reviewed the family history and her personal cancer history information in detail. Cases of cancer follow three general patterns: sporadic, familial, and hereditary.  While most cancer cases are sporadic (~70% of cancer cases), some cases appear to occur in family clusters.  These cases are said to be familial and account for around 20% of cancer cases.  Familial cases may be due to a combination of shared genes and environmental factors among family members that we cannot evaluate via genetic testing at this time.  In 5% - 10% of cancer cases, the risk for cancer is inherited, and the genes responsible for the  "increased cancer risk are known.       Family histories typical of hereditary cancer syndromes usually include multiple first- and second-degree relatives diagnosed with cancer types that define a syndrome.  These cases tend to be diagnosed at younger-than-expected ages and can be bilateral or multifocal.  The cancer in these families follows an autosomal dominant inheritance pattern, which indicates the likely presence of a mutation in a cancer gene.  Children and siblings of an individual carrying a mutation have a 50% chance of inheriting that mutation, thereby inheriting the increased risk to develop cancer.  However, it is not recommended to pursue genetic testing for adult-onset cancers in children as the results would not have clinical utility until some time in adulthood among other ethical reasons.  These mutations can be passed down from the maternal or the paternal lineage.     We discussed that risk factors or \"red flags\" for hereditary risk include cancers diagnosed at earlier-than-average ages, multiple family members diagnosed with cancers associated with mutations in the same gene, or multiple generations of associated cancers. Dependent on the specific cancer type or syndrome, there exists the potential for several clinically significant genes related to the cancer's onset or increased risk for development.  Therefore, the standard approach to hereditary cancer genetic testing at this time is via multi-gene panel analyzing several genes associated with a hereditary risk for cancer.  We reviewed the results in the context of the patient's personal and family history to determine what, if any, post-test cancer risk management changes are recommended.       GENETIC TESTING  The risks, benefits, and limitations of genetic testing and implications for clinical management following testing were reviewed.  We discussed the implications and limitations of a positive, negative, or VUS test result.  Genetic " test results can influence decisions regarding screening and prevention.  Genetic testing can have significant psychological implications for both individuals and families. Also discussed was the possibility of insurance discrimination based on genetic test results and the laws in place to prevent this, as well as the limitations of these laws.       The Genetic Information Nondiscrimination Act (CHANTAL) is a federal law enacted in May 2008.  CHANTAL prohibits discrimination on the basis of genetic information such as genetic test results with respect to health insurance and employment status.  Under Title 1 of CHANTAL, health insurance companies are prohibited from using an individual's genetic information to change premiums, drop or change an individual's coverage, or prevent coverage from being acquired.  Title 2 of CHANTAL prohibits employers from using genetic information in employment decisions such as, but not limited to, hiring, firing, promotions, pay, and job assignments.  CHANTAL protections do not protect against genetic discrimination by life insurance, long-term care or disability insurance,  service members, federal employees, those using the [employer] [facility], or those employed by a small business with fewer than 15 employees.     FAMILY CONSIDERATIONS  Genetic testing for family members solely based on the discovery of a VUS is not recommended.  If Ms. Quintero has children in the future it would be uninformative to test them as she cannot pass on mutations that she does not have.  However, if this VUS is upgraded to a pathogenic mutation in the future then these recommendations could change.    Genetic testing for Ms. Quintero's close family members may be informative.  The known familial BRCA1 mutation has been established on her maternal family's side.  Therefore, her maternal relatives would be appropriate candidates for genetic counseling and genetic testing.  Ms. Quintero's sister was reported have  underwent germline genetic testing as well, and was reportedly found not to carry the familial BRCA1 mutation, however, a copy of this genetic test was not available to confirm.  If she is in fact negative for the familial BRCA1 mutation her children would not be appropriate for genetic testing as Ms. Araujo's sister cannot pass on mutations she does not have.  If it is discovered that Ms. Araujo's sister did not have germline genetic testing then she would be an appropriate candidate for genetic counseling and genetic testing due to the known familial BRCA1 mutation.       Family members are encouraged to discuss their family history of cancer and appropriate cancer screening recommendations with their healthcare providers.  Family members are also encouraged to inquire about information regarding genetics and genetic testing, if appropriate, at a clinic that offers genetic counseling or their healthcare provider.  We would be happy to see relatives in our clinic for genetic counseling and testing.  They can request a referral from their healthcare provider to [facility] and that will prompt a coordinator to call them for an appointment.   For family members who live elsewhere, there are genetic counselors [facility]  They can find a genetic counselor by visiting the National Society of Genetic Counselors website at www.nsgc.org or they can call our office and we would be happy to give them the contact information of the closest genetic counselor.     SUMMARY  Ms. Underwoods hereditary cancer genetic test identified no pathogenic mutations explaining her family history of cancer.  Ms. Quintero's genetic test result did find a VUS in the BRCA2 (c.4357A>G) gene and is not to be used in clinical decision-making per the American College of Medical Genetics (ACMG) guidelines.  The Tyrer-Cuzick model estimates her lifetime breast cancer risk is 28.1%, however, Ms. Quintero is considered a  true negative for the familial BRCA1 mutation that explains her family history of cancer.    Ms. Quintero future breast cancer screening and future risk reduction should be guided by the previously described recommendations.  A referral to the [facility] Management Clinic at [facility] will be placed.  A copy of the genetic test report is attached to this note.      Ms. Quintero asked excellent questions during the consult and did not demonstrate any acute psychosocial distress during our visit. This clinic encounter was 20 minutes in duration.      Bonifacio Castro MS  Genetic Counselor  [facility]    Cc:  Karena Disla APRN

## 2023-12-11 DIAGNOSIS — F41.9 ANXIETY: ICD-10-CM

## 2023-12-11 RX ORDER — BUPROPION HYDROCHLORIDE 150 MG/1
150 TABLET ORAL DAILY
Qty: 30 TABLET | Refills: 0 | OUTPATIENT
Start: 2023-12-11

## 2024-02-28 ENCOUNTER — OFFICE VISIT (OUTPATIENT)
Dept: INTERNAL MEDICINE | Facility: CLINIC | Age: 39
End: 2024-02-28
Payer: COMMERCIAL

## 2024-02-28 ENCOUNTER — HOSPITAL ENCOUNTER (OUTPATIENT)
Dept: GENERAL RADIOLOGY | Facility: HOSPITAL | Age: 39
Discharge: HOME | End: 2024-02-28
Admitting: NURSE PRACTITIONER
Payer: COMMERCIAL

## 2024-02-28 VITALS
BODY MASS INDEX: 33.45 KG/M2 | SYSTOLIC BLOOD PRESSURE: 124 MMHG | HEIGHT: 65 IN | HEART RATE: 75 BPM | RESPIRATION RATE: 14 BRPM | OXYGEN SATURATION: 97 % | TEMPERATURE: 97.6 F | WEIGHT: 200.8 LBS | DIASTOLIC BLOOD PRESSURE: 88 MMHG

## 2024-02-28 DIAGNOSIS — M25.531 PAIN AND SWELLING OF RIGHT WRIST: Primary | ICD-10-CM

## 2024-02-28 DIAGNOSIS — M25.531 PAIN AND SWELLING OF RIGHT WRIST: ICD-10-CM

## 2024-02-28 DIAGNOSIS — M25.431 PAIN AND SWELLING OF RIGHT WRIST: ICD-10-CM

## 2024-02-28 DIAGNOSIS — M25.431 PAIN AND SWELLING OF RIGHT WRIST: Primary | ICD-10-CM

## 2024-02-28 PROCEDURE — 73110 X-RAY EXAM OF WRIST: CPT

## 2024-02-28 RX ORDER — DEXAMETHASONE SODIUM PHOSPHATE 10 MG/ML
10 INJECTION INTRAMUSCULAR; INTRAVENOUS ONCE
Status: COMPLETED | OUTPATIENT
Start: 2024-02-28 | End: 2024-02-28

## 2024-02-28 RX ADMIN — DEXAMETHASONE SODIUM PHOSPHATE 10 MG: 10 INJECTION INTRAMUSCULAR; INTRAVENOUS at 09:55

## 2024-02-28 NOTE — PROGRESS NOTES
Follow Up Office Visit      Date: 2024   Patient Name: Maria Luisa Quintero  : 1985   MRN: [id number]     Chief Complaint:    Chief Complaint   Patient presents with    Wrist Pain     R Wrist   Started Monday          History of Present Illness: Maria Luisa Quintero is a 38 y.o. female who is here today for an acute visit with c/o right wrist pain and swelling without known injury. Onset 2 days ago. Right wrist only. Pt reports constant pain 7/10 and worsening with flexion and extension of wrist joint and lifting. Patient states she lifts weights often and carries her two children often, but does not recall a time of injury. Pt reports limited ROM. Radiation to right index finger and thumb. Has tried ice therapy and TENS unit therapy with little relief. Has tried naproxen and that has given relief, however she is not supposed to take this due to her hx of Chron's dx.       Subjective      Review of Systems:   Review of Systems   Musculoskeletal:  Positive for arthralgias and joint swelling.   All other systems reviewed and are negative.      I have reviewed the patients family history, social history, past medical history, past surgical history and have updated it as appropriate.     Medications:     Current Outpatient Medications:     ibuprofen (ADVIL,MOTRIN) 200 MG tablet, , Disp: , Rfl:     levocetirizine (XYZAL) 5 MG tablet, Take 1 tablet by mouth As Needed., Disp: , Rfl:     Multiple Vitamins-Minerals (MULTIVITAMIN ADULT, MINERALS, PO), , Disp: , Rfl:     Probiotic Product (Probiotic-10) chewable tablet, , Disp: , Rfl:     Pseudoephedrine HCl (SUDAFED 12 HOUR PO), , Disp: , Rfl:   No current facility-administered medications for this visit.    Allergies:   Allergies   Allergen Reactions    Humira [Adalimumab] Other (See Comments)     Drug induced lupus    Remicade [Infliximab] Other (See Comments)     Drug induced lupus       Objective     Physical Exam: Please see above  Vital Signs:   Vitals:     "02/28/24 0907   BP: 124/88   BP Location: Left arm   Patient Position: Sitting   Cuff Size: Large Adult   Pulse: 75   Resp: 14   Temp: 97.6 °F (36.4 °C)   TempSrc: Temporal   SpO2: 97%   Weight: 91.1 kg (200 lb 12.8 oz)   Height: 165.1 cm (65\")   PainSc:   7   PainLoc: Wrist     Body mass index is 33.41 kg/m².    Physical Exam  Vitals and nursing note reviewed.   Constitutional:       General: She is not in acute distress.     Appearance: Normal appearance. She is not ill-appearing.   Musculoskeletal:      Right upper arm: Normal.      Left upper arm: Normal.      Right elbow: Normal.      Left elbow: Normal.      Right forearm: Swelling and tenderness present.      Right wrist: Swelling and tenderness present. Decreased range of motion.      Left wrist: Normal.      Right hand: Normal.      Left hand: Normal.        Arms:    Neurological:      Mental Status: She is alert.             Results:   Imaging:     Labs:        Assessment / Plan      Assessment/Plan:   Diagnoses and all orders for this visit:    1. Pain and swelling of right wrist (Primary)  -     XR Wrist 3+ View Right; Future  -     dexAMETHasone (DECADRON) injection 10 mg    -PT if no improvement  -further rec after imaging  -compression, rest, ice, elevation  -wrapped by MA       Follow Up:   Return if symptoms worsen or fail to improve, for Next scheduled follow up.    NICOLE Willis  [facility] [place]   "

## 2024-04-19 ENCOUNTER — TELEPHONE (OUTPATIENT)
Dept: INTERNAL MEDICINE | Facility: CLINIC | Age: 39
End: 2024-04-19

## 2024-04-19 NOTE — TELEPHONE ENCOUNTER
Caller: Maria Luisa Quintero    Relationship: Self    Best call back number: [phone]     What is the medical concern/diagnosis: ROUTINE MAMMOGRAM    What specialty or service is being requested: MAMMOGRAM    What is the provider, practice or medical service name: [facility]    Any additional details: PATIENT NEEDS TO GET HER MAMMOGRAM DONE.

## 2024-04-23 DIAGNOSIS — Z12.31 ENCOUNTER FOR SCREENING MAMMOGRAM FOR MALIGNANT NEOPLASM OF BREAST: Primary | ICD-10-CM

## 2024-05-02 DIAGNOSIS — N64.4 BREAST PAIN, RIGHT: Primary | ICD-10-CM

## 2024-05-16 ENCOUNTER — TELEPHONE (OUTPATIENT)
Dept: INTERNAL MEDICINE | Facility: CLINIC | Age: 39
End: 2024-05-16
Payer: COMMERCIAL

## 2024-05-16 NOTE — TELEPHONE ENCOUNTER
HUB TO RELAY    CALLED PATIENT REGARDING MAMMOGRAM ORDER. LVM WITH OFFICE NUMBER FOR PATIENT TO CALL BACK.    PER MAMMOGRAPHY DEPARTMENT: SPECIFIC LOCATION OF BREAST PAIN IS NEEDED BEFORE REFERRAL CAN BE PROCESSED.     PER SHEY: PATIENT NEEDS TO SCHEDULE AN OFFICE VISIT FOR THE BREAST PAIN SO THAT THE LOCATION CAN BE DOCUMENTED AND SENT OVER TO MAMMOGRAPHY DEPT FOR THE PATIENT TO BE ABLE TO BE SCHEDULED.     PLEASE SCHEDULE FIRST AVAILABLE OV WITH SHEY JUAN FOR THE PATIENT.

## 2024-05-20 ENCOUNTER — TELEPHONE (OUTPATIENT)
Dept: INTERNAL MEDICINE | Facility: CLINIC | Age: 39
End: 2024-05-20
Payer: COMMERCIAL

## 2024-05-20 NOTE — TELEPHONE ENCOUNTER
Called and left VM for pt to return call to office.     OK for HUB to reschedule pt for Karena Starks on Tues 5/21 or Wednes 5/22 if pt would like rather than seeing NICOLE Rodriguez on 5/23.

## 2024-05-21 ENCOUNTER — OFFICE VISIT (OUTPATIENT)
Dept: INTERNAL MEDICINE | Facility: CLINIC | Age: 39
End: 2024-05-21
Payer: COMMERCIAL

## 2024-05-21 VITALS
WEIGHT: 196.6 LBS | HEART RATE: 115 BPM | DIASTOLIC BLOOD PRESSURE: 82 MMHG | RESPIRATION RATE: 22 BRPM | SYSTOLIC BLOOD PRESSURE: 112 MMHG | BODY MASS INDEX: 32.76 KG/M2 | HEIGHT: 65 IN | TEMPERATURE: 97.6 F | OXYGEN SATURATION: 99 %

## 2024-05-21 DIAGNOSIS — R79.89 ABNORMAL TSH: ICD-10-CM

## 2024-05-21 DIAGNOSIS — N64.4 PAIN OF RIGHT BREAST: Primary | ICD-10-CM

## 2024-05-21 PROCEDURE — 99213 OFFICE O/P EST LOW 20 MIN: CPT | Performed by: NURSE PRACTITIONER

## 2024-05-21 NOTE — PROGRESS NOTES
Office Note     Name: Maria Luisa Quintero    : 1985     MRN: [id number]     Chief Complaint  Referral Discussion    Subjective     History of Present Illness:  Maria Luisa Quintero is a 38 y.o. female who presents today for discussion of a referral.  Patient had a routine bilateral screening mammogram ordered.  When they called her to schedule the mammogram she mentioned having some pain to her right breast.  They then recommended that she have a diagnostic mammogram.  The pain was not present at our previous visit.  She reports the pain is only to her right breast area.  She reports the pain is localized to 1 area.  She does not feel it is related to her anxiety.  It has been ongoing for about 4 weeks.  She is able to pinpoint the area of pain.  She reports having cystic breast tissue.  She has had previous mammograms in the past.  She would also like to have her TSH level rechecked while she is here.  No further complaints or concerns at this time.  Pleasant visit with the patient today.        Past Medical History:   Diagnosis Date    Allergic     Hayfever    Crohn's colitis        Past Surgical History:   Procedure Laterality Date    ABSCESS DRAINAGE       SECTION  21    COLONOSCOPY  10/2022    DENTAL PROCEDURE         Social History     Socioeconomic History    Marital status:    Tobacco Use    Smoking status: Never    Smokeless tobacco: Never   Vaping Use    Vaping status: Never Used   Substance and Sexual Activity    Alcohol use: Yes     Alcohol/week: 3.0 standard drinks of alcohol     Types: 3 Cans of beer per week     Comment: 3 drinks per week    Drug use: Never    Sexual activity: Yes     Partners: Male     Birth control/protection: Condom         Current Outpatient Medications:     ibuprofen (ADVIL,MOTRIN) 200 MG tablet, , Disp: , Rfl:     levocetirizine (XYZAL) 5 MG tablet, Take 1 tablet by mouth As Needed., Disp: , Rfl:     Multiple Vitamins-Minerals (MULTIVITAMIN ADULT,  "MINERALS, PO), , Disp: , Rfl:     Probiotic Product (Probiotic-10) chewable tablet, , Disp: , Rfl:     Pseudoephedrine HCl (SUDAFED 12 HOUR PO), , Disp: , Rfl:     Objective     Vital Signs  /82   Pulse 115   Temp 97.6 °F (36.4 °C) (Temporal)   Resp 22   Ht 165.1 cm (65\")   Wt 89.2 kg (196 lb 9.6 oz)   SpO2 99%   BMI 32.72 kg/m²   Estimated body mass index is 32.72 kg/m² as calculated from the following:    Height as of this encounter: 165.1 cm (65\").    Weight as of this encounter: 89.2 kg (196 lb 9.6 oz).           Physical Exam  Constitutional:       General: She is not in acute distress.     Appearance: Normal appearance. She is not ill-appearing.   HENT:      Head: Normocephalic and atraumatic.      Nose: Nose normal.   Eyes:      Extraocular Movements: Extraocular movements intact.      Conjunctiva/sclera: Conjunctivae normal.      Pupils: Pupils are equal, round, and reactive to light.   Cardiovascular:      Rate and Rhythm: Normal rate.   Pulmonary:      Effort: Pulmonary effort is normal. No respiratory distress.   Chest:          Comments: Tenderness to palpation to right breast at approximately 2:00 to the upper inner quadrant.  No palpable mass noted.  Musculoskeletal:         General: Normal range of motion.      Cervical back: Neck supple.   Skin:     General: Skin is warm and dry.   Neurological:      General: No focal deficit present.      Mental Status: She is alert and oriented to person, place, and time. Mental status is at baseline.   Psychiatric:         Mood and Affect: Mood normal.         Behavior: Behavior normal.         Thought Content: Thought content normal.         Judgment: Judgment normal.          Assessment and Plan     Diagnoses and all orders for this visit:    1. Pain of right breast (Primary)  -     Mammo Diagnostic Digital Tomosynthesis Bilateral With CAD; Future    2. Abnormal TSH  -     TSH Rfx On Abnormal To Free T4; Future        Follow Up  Return if symptoms " worsen or fail to improve, for Next scheduled follow up.    NICOLE Fernandez    Part of this note may be an electronic transcription/translation of spoken language to printed text using the Dragon Dictation System.  Answers submitted by the patient for this visit:  Other (Submitted on 5/21/2024)  Please describe your symptoms.: Referral for mammogram  Have you had these symptoms before?: No  How long have you been having these symptoms?: Greater than 2 weeks  Please list any medications you are currently taking for this condition.: Zyrtec, sudafed, probiotic, mvi  Primary Reason for Visit (Submitted on 5/21/2024)  What is the primary reason for your visit?: Other

## 2024-05-22 ENCOUNTER — LAB (OUTPATIENT)
Dept: LAB | Facility: HOSPITAL | Age: 39
End: 2024-05-22
Payer: COMMERCIAL

## 2024-05-22 DIAGNOSIS — R79.89 ABNORMAL TSH: ICD-10-CM

## 2024-05-22 PROCEDURE — 36415 COLL VENOUS BLD VENIPUNCTURE: CPT

## 2024-05-22 PROCEDURE — 84443 ASSAY THYROID STIM HORMONE: CPT

## 2024-05-23 LAB — TSH SERPL DL<=0.05 MIU/L-ACNC: 1.15 UIU/ML (ref 0.27–4.2)

## 2024-06-28 ENCOUNTER — HOSPITAL ENCOUNTER (OUTPATIENT)
Facility: HOSPITAL | Age: 39
Discharge: HOME | End: 2024-06-28
Payer: COMMERCIAL

## 2024-06-28 DIAGNOSIS — N64.4 PAIN OF RIGHT BREAST: ICD-10-CM

## 2024-06-28 PROCEDURE — 76642 ULTRASOUND BREAST LIMITED: CPT

## 2024-06-28 PROCEDURE — 77066 DX MAMMO INCL CAD BI: CPT

## 2024-06-28 PROCEDURE — G0279 TOMOSYNTHESIS, MAMMO: HCPCS

## 2024-08-26 ENCOUNTER — OFFICE VISIT (OUTPATIENT)
Dept: INTERNAL MEDICINE | Facility: CLINIC | Age: 39
End: 2024-08-26
Payer: COMMERCIAL

## 2024-08-26 ENCOUNTER — HOSPITAL ENCOUNTER (OUTPATIENT)
Dept: GENERAL RADIOLOGY | Facility: HOSPITAL | Age: 39
Discharge: HOME | End: 2024-08-26
Payer: COMMERCIAL

## 2024-08-26 VITALS
WEIGHT: 198.2 LBS | TEMPERATURE: 97.4 F | SYSTOLIC BLOOD PRESSURE: 122 MMHG | HEART RATE: 76 BPM | HEIGHT: 65 IN | OXYGEN SATURATION: 98 % | BODY MASS INDEX: 33.02 KG/M2 | RESPIRATION RATE: 16 BRPM | DIASTOLIC BLOOD PRESSURE: 72 MMHG

## 2024-08-26 DIAGNOSIS — S99.912A INJURY OF LEFT ANKLE, INITIAL ENCOUNTER: Primary | ICD-10-CM

## 2024-08-26 DIAGNOSIS — S99.912A INJURY OF LEFT ANKLE, INITIAL ENCOUNTER: ICD-10-CM

## 2024-08-26 PROCEDURE — 99213 OFFICE O/P EST LOW 20 MIN: CPT

## 2024-08-26 PROCEDURE — 73630 X-RAY EXAM OF FOOT: CPT

## 2024-08-26 PROCEDURE — 73610 X-RAY EXAM OF ANKLE: CPT

## 2024-08-26 RX ORDER — MULTIVIT WITH MINERALS/LUTEIN
1000 TABLET ORAL DAILY
COMMUNITY

## 2024-08-26 NOTE — PROGRESS NOTES
Follow Up Office Visit      Date: 2024  Patient Name: Maria Luisa Quintero  : 1985   MRN: [id number]     Chief Complaint:    Chief Complaint   Patient presents with    Ankle Injury     Rolled left ankle, swollen tender to touch - while running 24       History of Present Illness: Maria Luisa Quintero is a 38 y.o. female who is here today for evaluation of left ankle pain.  Patient reports she was out running yesterday when she rolled her ankle causing pain to lateral aspect of foot, heel, and arch. She has not taken anything for pain. She has been able to ambulate since. No other injuries reported.     Subjective      Review of Systems:   Review of Systems   Constitutional:  Negative for fever.   Musculoskeletal:  Positive for arthralgias and joint swelling.   Skin:  Negative for color change, pallor and bruise.   Neurological:  Negative for weakness and numbness.       Medications:     Current Outpatient Medications:     ascorbic acid (VITAMIN C) 1000 MG tablet, Take 1 tablet by mouth Daily., Disp: , Rfl:     ibuprofen (ADVIL,MOTRIN) 200 MG tablet, , Disp: , Rfl:     levocetirizine (XYZAL) 5 MG tablet, Take 1 tablet by mouth As Needed., Disp: , Rfl:     Multiple Vitamins-Minerals (MULTIVITAMIN ADULT, MINERALS, PO), , Disp: , Rfl:     Probiotic Product (Probiotic-10) chewable tablet, , Disp: , Rfl:     Pseudoephedrine HCl (SUDAFED 12 HOUR PO), , Disp: , Rfl:     Diclofenac Sodium (VOLTAREN) 1 % gel gel, Apply 4 g topically to the appropriate area as directed 4 (Four) Times a Day As Needed (pain)., Disp: 50 g, Rfl: 3    Allergies:   Allergies   Allergen Reactions    Humira [Adalimumab] Other (See Comments)     Drug induced lupus    Remicade [Infliximab] Other (See Comments)     Drug induced lupus       Objective     Physical Exam:  Vital Signs:   Vitals:    24 1107   BP: 122/72   Pulse: 76   Resp: 16   Temp: 97.4 °F (36.3 °C)   TempSrc: Temporal   SpO2: 98%   Weight: 89.9 kg (198 lb 3.2 oz)  "  Height: 165.1 cm (65\")   PainSc:   7   PainLoc: Ankle     Body mass index is 32.98 kg/m².   Facility age limit for growth %kevin is 20 years.    Physical Exam  Vitals and nursing note reviewed.   Constitutional:       General: She is not in acute distress.     Appearance: Normal appearance.   Eyes:      Extraocular Movements: Extraocular movements intact.      Conjunctiva/sclera: Conjunctivae normal.   Cardiovascular:      Comments: Left DP and PT pulses intact   Pulmonary:      Effort: Pulmonary effort is normal. No respiratory distress.   Musculoskeletal:      Left ankle: Swelling (to lateral malleolus) present. Tenderness present over the lateral malleolus. Decreased range of motion (due to pain). Normal pulse.      Comments: Diffuse TTP to plantar aspect of left foot   Neurological:      General: No focal deficit present.      Mental Status: She is alert and oriented to person, place, and time. Mental status is at baseline.   Psychiatric:         Mood and Affect: Mood normal.         Behavior: Behavior normal.         Assessment / Plan      Assessment/Plan:   Diagnoses and all orders for this visit:    1. Injury of left ankle, initial encounter (Primary)  -     XR Foot 3+ View Left; Future  -     XR Ankle 3+ View Left; Future  -     Diclofenac Sodium (VOLTAREN) 1 % gel gel; Apply 4 g topically to the appropriate area as directed 4 (Four) Times a Day As Needed (pain).  Dispense: 50 g; Refill: 3       PLAN:  - RICE recommended, will rx topical diclofenac and call patient with results  - Return to the office if symptoms worsen or fail to improve with current plan     Follow Up:   Return in about 1 month (around 9/26/2024) for Annual with NICOLE Fernandez.      Layne Lamar PA-C    [facility]   "

## 2024-08-27 ENCOUNTER — TELEPHONE (OUTPATIENT)
Dept: INTERNAL MEDICINE | Facility: CLINIC | Age: 39
End: 2024-08-27
Payer: COMMERCIAL

## 2024-08-27 NOTE — TELEPHONE ENCOUNTER
Called and spoke to pt. Gave message from provider. Pt voiced understanding and appreciation.       ----- Message from Layne Lamar sent at 8/27/2024  2:18 PM EDT -----  Please let patient know her left ankle and left foot x-rays are negative. No fractures noted. Thank you.

## 2024-09-03 ENCOUNTER — PRIOR AUTHORIZATION (OUTPATIENT)
Dept: INTERNAL MEDICINE | Facility: CLINIC | Age: 39
End: 2024-09-03
Payer: COMMERCIAL

## 2024-09-03 NOTE — TELEPHONE ENCOUNTER
Prior Authorization Created 8/29/24  Prior Authorization Denied 8/30/24    Pharmacy called and notified

## 2024-11-13 ENCOUNTER — OFFICE VISIT (OUTPATIENT)
Dept: INTERNAL MEDICINE | Facility: CLINIC | Age: 39
End: 2024-11-13
Payer: COMMERCIAL

## 2024-11-13 VITALS
HEIGHT: 65 IN | DIASTOLIC BLOOD PRESSURE: 86 MMHG | SYSTOLIC BLOOD PRESSURE: 124 MMHG | WEIGHT: 200.8 LBS | HEART RATE: 73 BPM | OXYGEN SATURATION: 98 % | BODY MASS INDEX: 33.45 KG/M2

## 2024-11-13 DIAGNOSIS — B37.9 ANTIBIOTIC-INDUCED YEAST INFECTION: ICD-10-CM

## 2024-11-13 DIAGNOSIS — T36.95XA ANTIBIOTIC-INDUCED YEAST INFECTION: ICD-10-CM

## 2024-11-13 DIAGNOSIS — R30.0 DYSURIA: ICD-10-CM

## 2024-11-13 DIAGNOSIS — R14.0 ABDOMINAL BLOATING: ICD-10-CM

## 2024-11-13 DIAGNOSIS — R35.0 FREQUENT URINATION: ICD-10-CM

## 2024-11-13 DIAGNOSIS — R30.0 BURNING WITH URINATION: Primary | ICD-10-CM

## 2024-11-13 LAB
BILIRUB BLD-MCNC: NEGATIVE MG/DL
CLARITY, POC: ABNORMAL
COLOR UR: YELLOW
EXPIRATION DATE: ABNORMAL
GLUCOSE UR STRIP-MCNC: NEGATIVE MG/DL
KETONES UR QL: NEGATIVE
LEUKOCYTE EST, POC: NEGATIVE
Lab: ABNORMAL
NITRITE UR-MCNC: NEGATIVE MG/ML
PH UR: 6 [PH] (ref 5–8)
PROT UR STRIP-MCNC: NEGATIVE MG/DL
RBC # UR STRIP: NEGATIVE /UL
SP GR UR: 1 (ref 1–1.03)
UROBILINOGEN UR QL: ABNORMAL

## 2024-11-13 PROCEDURE — 81003 URINALYSIS AUTO W/O SCOPE: CPT | Performed by: NURSE PRACTITIONER

## 2024-11-13 PROCEDURE — 87086 URINE CULTURE/COLONY COUNT: CPT | Performed by: NURSE PRACTITIONER

## 2024-11-13 PROCEDURE — 99213 OFFICE O/P EST LOW 20 MIN: CPT | Performed by: NURSE PRACTITIONER

## 2024-11-13 RX ORDER — FLUCONAZOLE 150 MG/1
150 TABLET ORAL
Qty: 2 TABLET | Refills: 0 | Status: SHIPPED | OUTPATIENT
Start: 2024-11-13

## 2024-11-13 RX ORDER — NITROFURANTOIN 25; 75 MG/1; MG/1
100 CAPSULE ORAL 2 TIMES DAILY
Qty: 10 CAPSULE | Refills: 0 | Status: SHIPPED | OUTPATIENT
Start: 2024-11-13 | End: 2024-11-18

## 2024-11-13 NOTE — PROGRESS NOTES
Office Note     Name: Maria Luisa Quintero    : 1985     MRN: [id number]     Chief Complaint  Urinary Tract Infection (Patient reports today for a possible UTI. Patient states that she has been experiencing symptoms including bloating, burning while urinating , frequent urination and vaginal discomfort. Patient states these symptoms have been going on since last night and she has not taken anything OTC for symptoms. )    Subjective     History of Present Illness:  Maria Luisa Quintero is a 39 y.o. female who presents today for evaluation of urinary complaints.    Patient reports onset of symptoms was last night.  She has been experiencing an intermittent burning sensation as well as discomfort with urinating.  She has also noted some increase in urinary urgency.  She is not able to urinate every time she feels she needs to go.  She is having some low abdominal pressure as well as vaginal discomfort.  She also feels like she is bloated.  She denies any low back pain.  She denies any fever.  She reports her urine is clear in color.  She has been drinking lots of water to try to flush out her system.  She did notice some mild odor to her urine.    She has not taken any over-the-counter medication to assist with symptoms.  No further complaints or concerns at this time.  Pleasant visit with the patient today.      Past Medical History:   Diagnosis Date    Allergic     Hayfever    Crohn's colitis        Past Surgical History:   Procedure Laterality Date    ABSCESS DRAINAGE       SECTION  21    COLONOSCOPY  10/2022    DENTAL PROCEDURE         Social History     Socioeconomic History    Marital status:    Tobacco Use    Smoking status: Never    Smokeless tobacco: Never   Vaping Use    Vaping status: Never Used   Substance and Sexual Activity    Alcohol use: Yes     Alcohol/week: 3.0 standard drinks of alcohol     Types: 3 Cans of beer per week     Comment: 3 drinks per week    Drug use: Never     "Sexual activity: Yes     Partners: Male     Birth control/protection: Condom         Current Outpatient Medications:     ascorbic acid (VITAMIN C) 1000 MG tablet, Take 1 tablet by mouth Daily., Disp: , Rfl:     Diclofenac Sodium (VOLTAREN) 1 % gel gel, Apply 4 g topically to the appropriate area as directed 4 (Four) Times a Day As Needed (pain)., Disp: 50 g, Rfl: 3    ibuprofen (ADVIL,MOTRIN) 200 MG tablet, , Disp: , Rfl:     levocetirizine (XYZAL) 5 MG tablet, Take 1 tablet by mouth As Needed., Disp: , Rfl:     Multiple Vitamins-Minerals (MULTIVITAMIN ADULT, MINERALS, PO), , Disp: , Rfl:     Probiotic Product (Probiotic-10) chewable tablet, , Disp: , Rfl:     Pseudoephedrine HCl (SUDAFED 12 HOUR PO), , Disp: , Rfl:     fluconazole (Diflucan) 150 MG tablet, Take 1 tablet by mouth Every 72 (Seventy-Two) Hours., Disp: 2 tablet, Rfl: 0    nitrofurantoin, macrocrystal-monohydrate, (Macrobid) 100 MG capsule, Take 1 capsule by mouth 2 (Two) Times a Day for 5 days., Disp: 10 capsule, Rfl: 0    Objective     Vital Signs  /86 (BP Location: Left arm, Patient Position: Sitting, Cuff Size: Adult)   Pulse 73   Ht 165.1 cm (65\")   Wt 91.1 kg (200 lb 12.8 oz)   SpO2 98%   BMI 33.41 kg/m²   Estimated body mass index is 33.41 kg/m² as calculated from the following:    Height as of this encounter: 165.1 cm (65\").    Weight as of this encounter: 91.1 kg (200 lb 12.8 oz).    BMI is >= 30 and <35. (Class 1 Obesity). The following options were offered after discussion;: Not addressed at this visit      Physical Exam  Constitutional:       General: She is not in acute distress.     Appearance: Normal appearance. She is not ill-appearing.   HENT:      Head: Normocephalic and atraumatic.      Nose: Nose normal.   Eyes:      Extraocular Movements: Extraocular movements intact.      Conjunctiva/sclera: Conjunctivae normal.      Pupils: Pupils are equal, round, and reactive to light.   Cardiovascular:      Rate and Rhythm: Normal " rate.   Pulmonary:      Effort: Pulmonary effort is normal. No respiratory distress.   Abdominal:      Tenderness: There is no abdominal tenderness. There is no right CVA tenderness or left CVA tenderness.   Musculoskeletal:         General: Normal range of motion.      Cervical back: Neck supple.   Skin:     General: Skin is warm and dry.   Neurological:      General: No focal deficit present.      Mental Status: She is alert and oriented to person, place, and time. Mental status is at baseline.   Psychiatric:         Mood and Affect: Mood normal.         Behavior: Behavior normal.         Thought Content: Thought content normal.         Judgment: Judgment normal.          Assessment and Plan     Diagnoses and all orders for this visit:    1. Burning with urination (Primary)  -     POCT urinalysis dipstick, automated  -     Urine Culture - Urine, Urine, Clean Catch; Future  -     Urine Culture - Urine, Urine, Clean Catch    2. Frequent urination  -     POCT urinalysis dipstick, automated  -     Urine Culture - Urine, Urine, Clean Catch; Future  -     Urine Culture - Urine, Urine, Clean Catch    3. Dysuria  -     POCT urinalysis dipstick, automated  -     nitrofurantoin, macrocrystal-monohydrate, (Macrobid) 100 MG capsule; Take 1 capsule by mouth 2 (Two) Times a Day for 5 days.  Dispense: 10 capsule; Refill: 0  -     Urine Culture - Urine, Urine, Clean Catch; Future  -     Urine Culture - Urine, Urine, Clean Catch    4. Abdominal bloating  -     POCT urinalysis dipstick, automated  -     Urine Culture - Urine, Urine, Clean Catch; Future  -     Urine Culture - Urine, Urine, Clean Catch    5. Antibiotic-induced yeast infection  -     fluconazole (Diflucan) 150 MG tablet; Take 1 tablet by mouth Every 72 (Seventy-Two) Hours.  Dispense: 2 tablet; Refill: 0        Follow Up  Return if symptoms worsen or fail to improve, for Next scheduled follow up.    NICOLE Fernandez    Part of this note may be an electronic  transcription/translation of spoken language to printed text using the Dragon Dictation System.

## 2024-11-14 LAB — BACTERIA SPEC AEROBE CULT: NO GROWTH

## 2024-12-17 ENCOUNTER — OFFICE VISIT (OUTPATIENT)
Dept: INTERNAL MEDICINE | Facility: CLINIC | Age: 39
End: 2024-12-17
Payer: COMMERCIAL

## 2024-12-17 VITALS
WEIGHT: 197.8 LBS | DIASTOLIC BLOOD PRESSURE: 76 MMHG | BODY MASS INDEX: 32.96 KG/M2 | OXYGEN SATURATION: 99 % | HEART RATE: 83 BPM | HEIGHT: 65 IN | TEMPERATURE: 97.8 F | SYSTOLIC BLOOD PRESSURE: 114 MMHG

## 2024-12-17 DIAGNOSIS — R09.81 NASAL CONGESTION: ICD-10-CM

## 2024-12-17 DIAGNOSIS — J02.9 SORE THROAT: Primary | ICD-10-CM

## 2024-12-17 DIAGNOSIS — R05.1 ACUTE COUGH: ICD-10-CM

## 2024-12-17 LAB
EXPIRATION DATE: NORMAL
FLUAV AG NPH QL: NEGATIVE
FLUBV AG NPH QL: NEGATIVE
INTERNAL CONTROL: NORMAL
Lab: NORMAL
S PYO AG THROAT QL: NEGATIVE
SARS-COV-2 AG UPPER RESP QL IA.RAPID: NOT DETECTED

## 2024-12-17 PROCEDURE — 87880 STREP A ASSAY W/OPTIC: CPT | Performed by: NURSE PRACTITIONER

## 2024-12-17 PROCEDURE — 87804 INFLUENZA ASSAY W/OPTIC: CPT | Performed by: NURSE PRACTITIONER

## 2024-12-17 PROCEDURE — 99214 OFFICE O/P EST MOD 30 MIN: CPT | Performed by: NURSE PRACTITIONER

## 2024-12-17 PROCEDURE — 87426 SARSCOV CORONAVIRUS AG IA: CPT | Performed by: NURSE PRACTITIONER

## 2024-12-17 RX ORDER — DEXTROMETHORPHAN HYDROBROMIDE AND PROMETHAZINE HYDROCHLORIDE 15; 6.25 MG/5ML; MG/5ML
5 SYRUP ORAL NIGHTLY PRN
Qty: 150 ML | Refills: 0 | Status: SHIPPED | OUTPATIENT
Start: 2024-12-17

## 2024-12-17 NOTE — PROGRESS NOTES
Office Note     Name: Maria Luisa Quintero    : 1985     MRN: [id number]     Chief Complaint  Sore Throat (Patient reports today for symptoms that started on 12/15 that include cough, phlegm, nasal congestion and chest congestion. Patient states that she has taken OTC meds for symptoms with no relief. 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 ), URI (* chest congestion), and Nasal Congestion    Subjective     History of Present Illness:  Maria Luisa Quintero is a 39 y.o. female who presents today for evaluation of acute complaints.    Patient reports onset of symptoms was 2 days ago.  Patient has been experiencing a cough, sore throat, head and chest congestion, and generally feeling unwell.  She reports the cough as occasionally productive with a thick, chunky, green mucus.    She feels she is experiencing more chest congestion and sinus.  She denies any known fever.  She has been around lots of sick contacts at work.    She is taken over-the-counter Mucinex, Sudafed, and ibuprofen with minimal relief in symptoms.    She presents today for evaluation of the above acute complaints.  No further complaints or concerns at this time.  Pleasant visit with the patient today.      Past Medical History:   Diagnosis Date    Allergic     Hayfever    Crohn's colitis        Past Surgical History:   Procedure Laterality Date    ABSCESS DRAINAGE       SECTION  21    COLONOSCOPY  10/2022    DENTAL PROCEDURE         Social History     Socioeconomic History    Marital status:    Tobacco Use    Smoking status: Never    Smokeless tobacco: Never   Vaping Use    Vaping status: Never Used   Substance and Sexual Activity    Alcohol use: Yes     Alcohol/week: 3.0 standard drinks of alcohol     Types: 3 Cans of beer per week     Comment: 3 drinks per week    Drug use: Never    Sexual activity: Yes     Partners: Male     Birth control/protection: Condom         Current  "Outpatient Medications:     ascorbic acid (VITAMIN C) 1000 MG tablet, Take 1 tablet by mouth Daily., Disp: , Rfl:     ibuprofen (ADVIL,MOTRIN) 200 MG tablet, , Disp: , Rfl:     Multiple Vitamins-Minerals (MULTIVITAMIN ADULT, MINERALS, PO), , Disp: , Rfl:     Probiotic Product (Probiotic-10) chewable tablet, , Disp: , Rfl:     Pseudoephedrine HCl (SUDAFED 12 HOUR PO), , Disp: , Rfl:     promethazine-dextromethorphan (PROMETHAZINE-DM) 6.25-15 MG/5ML syrup, Take 5 mL by mouth At Night As Needed for Cough., Disp: 150 mL, Rfl: 0    Objective     Vital Signs  /76   Pulse 83   Temp 97.8 °F (36.6 °C)   Ht 165.1 cm (65\")   Wt 89.7 kg (197 lb 12.8 oz)   SpO2 99%   BMI 32.92 kg/m²   Estimated body mass index is 32.92 kg/m² as calculated from the following:    Height as of this encounter: 165.1 cm (65\").    Weight as of this encounter: 89.7 kg (197 lb 12.8 oz).           Physical Exam  Constitutional:       General: She is not in acute distress.     Appearance: Normal appearance. She is not ill-appearing.   HENT:      Head: Normocephalic and atraumatic.      Nose: Nose normal.   Eyes:      Extraocular Movements: Extraocular movements intact.      Conjunctiva/sclera: Conjunctivae normal.      Pupils: Pupils are equal, round, and reactive to light.   Cardiovascular:      Rate and Rhythm: Normal rate and regular rhythm.   Pulmonary:      Effort: Pulmonary effort is normal. No respiratory distress.      Breath sounds: Normal breath sounds.      Comments: Cough noted during exam  Musculoskeletal:         General: Normal range of motion.      Cervical back: Neck supple.   Skin:     General: Skin is warm and dry.   Neurological:      General: No focal deficit present.      Mental Status: She is alert and oriented to person, place, and time. Mental status is at baseline.   Psychiatric:         Mood and Affect: Mood normal.         Behavior: Behavior normal.         Thought Content: Thought content normal.         Judgment: " Judgment normal.          Assessment and Plan     Diagnoses and all orders for this visit:    1. Sore throat (Primary)  -     POCT rapid strep A    2. Nasal congestion  -     POCT SARS-CoV-2 Antigen ANITRA  -     POCT Influenza A/B    3. Acute cough  -     promethazine-dextromethorphan (PROMETHAZINE-DM) 6.25-15 MG/5ML syrup; Take 5 mL by mouth At Night As Needed for Cough.  Dispense: 150 mL; Refill: 0    Plan:  COVID and flu swab in the office today negative.  Strep swab in the office today negative.  May continue with over-the-counter Mucinex, Sudafed, and ibuprofen as needed.  Will send in Promethazine DM to assist with cough and congestion.  Continue with adequate oral hydration and rest.  Return to clinic if symptoms worsen or fail to improve with current plan of care.    Follow Up  Return if symptoms worsen or fail to improve.    NICOLE Fernandez    Part of this note may be an electronic transcription/translation of spoken language to printed text using the Dragon Dictation System.

## 2025-01-13 ENCOUNTER — OFFICE VISIT (OUTPATIENT)
Dept: INTERNAL MEDICINE | Facility: CLINIC | Age: 40
End: 2025-01-13
Payer: COMMERCIAL

## 2025-01-13 VITALS
TEMPERATURE: 98.9 F | DIASTOLIC BLOOD PRESSURE: 84 MMHG | WEIGHT: 200.6 LBS | OXYGEN SATURATION: 97 % | HEIGHT: 65 IN | BODY MASS INDEX: 33.42 KG/M2 | HEART RATE: 78 BPM | SYSTOLIC BLOOD PRESSURE: 122 MMHG

## 2025-01-13 DIAGNOSIS — R05.1 ACUTE COUGH: ICD-10-CM

## 2025-01-13 DIAGNOSIS — J32.9 CHRONIC RHINOSINUSITIS: ICD-10-CM

## 2025-01-13 DIAGNOSIS — J02.9 SORE THROAT: Primary | ICD-10-CM

## 2025-01-13 LAB
EXPIRATION DATE: NORMAL
EXPIRATION DATE: NORMAL
FLUAV AG UPPER RESP QL IA.RAPID: NOT DETECTED
FLUBV AG UPPER RESP QL IA.RAPID: NOT DETECTED
INTERNAL CONTROL: NORMAL
INTERNAL CONTROL: NORMAL
Lab: NORMAL
Lab: NORMAL
S PYO AG THROAT QL: NEGATIVE
SARS-COV-2 AG UPPER RESP QL IA.RAPID: NOT DETECTED

## 2025-01-13 PROCEDURE — 87428 SARSCOV & INF VIR A&B AG IA: CPT | Performed by: STUDENT IN AN ORGANIZED HEALTH CARE EDUCATION/TRAINING PROGRAM

## 2025-01-13 PROCEDURE — 87880 STREP A ASSAY W/OPTIC: CPT | Performed by: STUDENT IN AN ORGANIZED HEALTH CARE EDUCATION/TRAINING PROGRAM

## 2025-01-13 PROCEDURE — 99214 OFFICE O/P EST MOD 30 MIN: CPT | Performed by: STUDENT IN AN ORGANIZED HEALTH CARE EDUCATION/TRAINING PROGRAM

## 2025-01-13 RX ORDER — FLUTICASONE PROPIONATE 50 MCG
SPRAY, SUSPENSION (ML) NASAL
COMMUNITY
Start: 2025-01-08

## 2025-01-13 RX ORDER — IPRATROPIUM BROMIDE 21 UG/1
2 SPRAY, METERED NASAL EVERY 12 HOURS
Qty: 120 ML | Refills: 1 | Status: SHIPPED | OUTPATIENT
Start: 2025-01-13 | End: 2025-03-14

## 2025-01-13 NOTE — PROGRESS NOTES
Follow Up Office Visit      Date: 2025   Patient Name: Maria Luisa Quintero  : 1985   MRN: [id number]     Chief Complaint:    Chief Complaint   Patient presents with    Nasal Congestion     Onset 2 weeks ago. Just finished Z-pack with no relief    Headache    Sore Throat     In the morning     Facial Pain       History of Present Illness: Maria Luisa Quintero is a 39 y.o. female who is here today for follow-up. She reports constant nasal build up and congestion with thick green mucus, sore throat, facial pain and headache. Fatigue started about a week ago, patient was given steroids for URTI recently at [facility] with no relief.  She denies fever or chills, also currently on antihistamine nose spray, allegra and Flonase for sinus symptoms with no improvement.     Subjective      Review of Systems:   Review of Systems   Constitutional:  Negative for chills and fever.   HENT:  Positive for congestion, rhinorrhea and sinus pressure. Negative for sore throat and swollen glands.    Respiratory:  Negative for cough, shortness of breath and wheezing.    Cardiovascular:  Negative for chest pain.   Neurological:  Positive for headache.       I have reviewed the patients family history, social history, past medical history, past surgical history and have updated it as appropriate.     Medications:     Current Outpatient Medications:     ascorbic acid (VITAMIN C) 1000 MG tablet, Take 1 tablet by mouth Daily., Disp: , Rfl:     fluticasone (FLONASE) 50 MCG/ACT nasal spray, SPRAY 1 SPRAY INTO EACH NOSTRIL EVERY DAY FOR 30 DAYS, Disp: , Rfl:     ibuprofen (ADVIL,MOTRIN) 200 MG tablet, , Disp: , Rfl:     Multiple Vitamins-Minerals (MULTIVITAMIN ADULT, MINERALS, PO), , Disp: , Rfl:     Probiotic Product (Probiotic-10) chewable tablet, , Disp: , Rfl:     promethazine-dextromethorphan (PROMETHAZINE-DM) 6.25-15 MG/5ML syrup, Take 5 mL by mouth At Night As Needed for Cough., Disp: 150 mL, Rfl: 0    Pseudoephedrine HCl (SUDAFED  "12 HOUR PO), , Disp: , Rfl:     ipratropium (ATROVENT) 0.03 % nasal spray, Administer 2 sprays into the nostril(s) as directed by provider Every 12 (Twelve) Hours for 60 days., Disp: 120 mL, Rfl: 1    phenylephrine (CITLALI-SYNEPHRINE) 0.5 % nasal spray, Administer 1 spray into the nostril(s) as directed by provider Every 4 (Four) Hours As Needed for Congestion for up to 3 days., Disp: 1 each, Rfl: 0    Allergies:   Allergies   Allergen Reactions    Humira [Adalimumab] Other (See Comments)     Drug induced lupus    Remicade [Infliximab] Other (See Comments)     Drug induced lupus       Objective     Physical Exam: Please see above  Vital Signs:   Vitals:    01/13/25 1524   BP: 122/84   BP Location: Left arm   Patient Position: Sitting   Pulse: 78   Temp: 98.9 °F (37.2 °C)   TempSrc: Temporal   SpO2: 97%   Weight: 91 kg (200 lb 9.6 oz)   Height: 165.1 cm (65\")     Body mass index is 33.38 kg/m².          Physical Exam  Vitals and nursing note reviewed.   Constitutional:       Appearance: Normal appearance.   HENT:      Head: Normocephalic and atraumatic.      Nose: Nose normal. Nasal tenderness, congestion and rhinorrhea present. Rhinorrhea is clear.      Right Turbinates: Not enlarged or swollen.      Left Turbinates: Not enlarged or swollen.      Right Sinus: Maxillary sinus tenderness and frontal sinus tenderness present.      Left Sinus: Maxillary sinus tenderness and frontal sinus tenderness present.      Mouth/Throat:      Mouth: Mucous membranes are moist.      Pharynx: Oropharynx is clear.   Eyes:      Extraocular Movements: Extraocular movements intact.      Pupils: Pupils are equal, round, and reactive to light.   Cardiovascular:      Rate and Rhythm: Normal rate and regular rhythm.      Pulses: Normal pulses.      Heart sounds: Normal heart sounds.   Pulmonary:      Effort: Pulmonary effort is normal.      Breath sounds: Normal breath sounds.   Musculoskeletal:         General: Normal range of motion.      " Cervical back: Normal range of motion.   Skin:     General: Skin is warm.   Neurological:      Mental Status: She is alert and oriented to person, place, and time.   Psychiatric:         Mood and Affect: Mood normal.         Behavior: Behavior normal.         Procedures    Results:   Labs:   TSH   Date Value Ref Range Status   05/22/2024 1.150 0.270 - 4.200 uIU/mL Final        POCT Results (if applicable):   Results for orders placed or performed in visit on 01/13/25   POCT SARS-CoV-2 Antigen ANITRA + Flu    Collection Time: 01/13/25  4:01 PM    Specimen: Swab   Result Value Ref Range    SARS Antigen Not Detected Not Detected, Presumptive Negative    Influenza A Antigen ANITRA Not Detected Not Detected    Influenza B Antigen ANITRA Not Detected Not Detected    Internal Control Passed Passed    Lot Number [id number]     Expiration Date 10-    POCT rapid strep A    Collection Time: 01/13/25  4:01 PM    Specimen: Swab   Result Value Ref Range    Rapid Strep A Screen Negative Negative, VALID, INVALID, Not Performed    Internal Control Passed Passed    Lot Number [id number]     Expiration Date 01-        Imaging:   No valid procedures specified.       Assessment / Plan      Assessment/Plan:   Diagnoses and all orders for this visit:    1. Sore throat (Primary)  -     POCT rapid strep A    2. Acute cough  -     POCT SARS-CoV-2 Antigen ANITRA + Flu: all negative    3. Chronic rhinosinusitis  -     CT sinus w wo contrast; Future  -     phenylephrine (CITLALI-SYNEPHRINE) 0.5 % nasal spray; Administer 1 spray into the nostril(s) as directed by provider Every 4 (Four) Hours As Needed for Congestion for up to 3 days.  Dispense: 1 each; Refill: 0  -     ipratropium (ATROVENT) 0.03 % nasal spray; Administer 2 sprays into the nostril(s) as directed by provider Every 12 (Twelve) Hours for 60 days.  Dispense: 120 mL; Refill: 1  -     Ambulatory Referral to Allergy: might also need skin allergy testing  -     Ambulatory Referral to ENT  (Otolaryngology)        Vaccine Counseling:      Follow Up:   No follow-ups on file.      Cassi Joseph MD  [facility] [place] [facility]

## 2025-01-15 ENCOUNTER — TELEPHONE (OUTPATIENT)
Dept: GASTROENTEROLOGY | Facility: CLINIC | Age: 40
End: 2025-01-15

## 2025-01-15 NOTE — TELEPHONE ENCOUNTER
Hub staff attempted to follow warm transfer process and was unsuccessful     Caller: Maria Luisa Quintero A    Relationship to patient: Self    Best call back number: [phone]    Patient is needing: PATIENT CALLED IN AND STATED THAT SHE DID NOT KNOW HER APPOINTMENT SCHEDULED FOR 01/17/2025 HAD BEEN CANCELLED. PATIENT STATED THAT APPOINTMENT FOR 02/05/2025 SHOULD BE FOR AN OFFICE CONSULT VISIT AND NOT A PROCEDURE. PATIENT WOULD LIKE A CALL BACK TO BE ADVISED. PLEASE CALL BACK ANYTIME, OKAY TO LEAVE VM.

## 2025-01-17 ENCOUNTER — TELEPHONE (OUTPATIENT)
Dept: INTERNAL MEDICINE | Facility: CLINIC | Age: 40
End: 2025-01-17

## 2025-01-17 ENCOUNTER — TELEPHONE (OUTPATIENT)
Dept: INTERNAL MEDICINE | Facility: CLINIC | Age: 40
End: 2025-01-17
Payer: COMMERCIAL

## 2025-01-17 DIAGNOSIS — J01.90 ACUTE BACTERIAL RHINOSINUSITIS: Primary | ICD-10-CM

## 2025-01-17 DIAGNOSIS — B96.89 ACUTE BACTERIAL RHINOSINUSITIS: Primary | ICD-10-CM

## 2025-01-17 NOTE — TELEPHONE ENCOUNTER
Called patient to discuss her symptoms, she continues to have ongoing Congestion, facial pain, greenish nasal discharge with no relief   Plan: sent Augmentin for acute bacterial rhino sinusitis.    Dr. Franz    Caller: Maria Luisa Quintero    Relationship: Self    Best call back number: [phone]     What medication are you requesting: ANTIBIOTIC    What are your current symptoms: SINUS      If a prescription is needed, what is your preferred pharmacy and phone number: [id number] - [place], [place] - [address]  - [phone]  - [phone] FX     Additional notes:PATIENT STATES THAT DR FRANZ CALLED TO SEE IF SHE WANTS A ANTIBIOTIC AND SHE DOES

## 2025-01-17 NOTE — TELEPHONE ENCOUNTER
Left message regarding results and referral and questions  Left office number for patient to return call    ----- Message from Cassi Joseph sent at 1/17/2025  2:02 PM EST -----  Her CT scan showed acute on chronic maxillary sinusitis. Did the decongestant and Atrovent help her symptoms?  Hopefulyl she is scheduled with ENT soon.

## 2025-01-17 NOTE — TELEPHONE ENCOUNTER
Patient returned call and says the medication is not working  Patient is looking forward to the call to schedule with ENT  No further questions

## 2025-01-17 NOTE — TELEPHONE ENCOUNTER
Caller: Maria Luisa Quintero    Relationship: Self    Best call back number: [phone]       What test was performed: CT    When was the test performed: 01/16/25    Where was the test performed: [facility]    Additional notes: PATIENT IS REQUESTING THE RESULTS OF HER CT SCAN. SHE NEEDS THEM SENT TO: [facility] [address] [place] [address]  PHONE: [phone]          
Detail Level: Zone
General Sunscreen Counseling: I recommended a broad spectrum sunscreen with a SPF of 30 or higher.  I explained that SPF 30 sunscreens block approximately 97 percent of the sun's harmful rays.  Sunscreens should be applied at least 15 minutes prior to expected sun exposure and then every 2 hours after that as long as sun exposure continues. If swimming or exercising sunscreen should be reapplied every 45 minutes to an hour after getting wet or sweating.  One ounce, or the equivalent of a shot glass full of sunscreen, is adequate to protect the skin not covered by a bathing suit. I also recommended a lip balm with a sunscreen as well. Sun protective clothing can be used in lieu of sunscreen but must be worn the entire time you are exposed to the sun's rays.

## 2025-03-26 ENCOUNTER — OFFICE VISIT (OUTPATIENT)
Dept: INTERNAL MEDICINE | Facility: CLINIC | Age: 40
End: 2025-03-26
Payer: COMMERCIAL

## 2025-03-26 VITALS
OXYGEN SATURATION: 98 % | HEIGHT: 65 IN | DIASTOLIC BLOOD PRESSURE: 72 MMHG | WEIGHT: 206.8 LBS | HEART RATE: 68 BPM | SYSTOLIC BLOOD PRESSURE: 118 MMHG | BODY MASS INDEX: 34.45 KG/M2 | TEMPERATURE: 97.6 F

## 2025-03-26 DIAGNOSIS — T36.95XA ANTIBIOTIC-INDUCED YEAST INFECTION: ICD-10-CM

## 2025-03-26 DIAGNOSIS — J02.9 SORE THROAT: ICD-10-CM

## 2025-03-26 DIAGNOSIS — R09.81 NASAL CONGESTION: ICD-10-CM

## 2025-03-26 DIAGNOSIS — R05.1 ACUTE COUGH: ICD-10-CM

## 2025-03-26 DIAGNOSIS — R52 BODY ACHES: ICD-10-CM

## 2025-03-26 DIAGNOSIS — J06.9 ACUTE URI: Primary | ICD-10-CM

## 2025-03-26 DIAGNOSIS — B37.9 ANTIBIOTIC-INDUCED YEAST INFECTION: ICD-10-CM

## 2025-03-26 LAB
EXPIRATION DATE: ABNORMAL
EXPIRATION DATE: NORMAL
FLUAV AG UPPER RESP QL IA.RAPID: NOT DETECTED
FLUBV AG UPPER RESP QL IA.RAPID: NOT DETECTED
INTERNAL CONTROL: ABNORMAL
INTERNAL CONTROL: NORMAL
Lab: ABNORMAL
Lab: NORMAL
S PYO AG THROAT QL: POSITIVE
SARS-COV-2 AG UPPER RESP QL IA.RAPID: NOT DETECTED

## 2025-03-26 RX ORDER — FLUCONAZOLE 150 MG/1
150 TABLET ORAL
Qty: 2 TABLET | Refills: 0 | Status: SHIPPED | OUTPATIENT
Start: 2025-03-26

## 2025-03-26 RX ORDER — DEXTROMETHORPHAN HYDROBROMIDE AND PROMETHAZINE HYDROCHLORIDE 15; 6.25 MG/5ML; MG/5ML
5 SYRUP ORAL NIGHTLY PRN
Qty: 120 ML | Refills: 0 | Status: SHIPPED | OUTPATIENT
Start: 2025-03-26

## 2025-03-26 NOTE — PROGRESS NOTES
Office Note     Name: Maria Luisa Quintero    : 1985     MRN: [id number]     Chief Complaint  Headache, Cough, Sore Throat, Chills, Generalized Body Aches, and Nasal Congestion (Patient reports today for illness symptoms including cough, nasal congestion, headache, sore throat, body aches and chills that started 2 days ago. Patient states that she has been taking musinex, daquil and ibuprofen. Patient states she has not gotten much relief. )    Subjective     History of Present Illness:  Maria Luisa Quintero is a 39 y.o. female who presents today for an acute visit with complaints of multiple upper respiratory symptoms. Patient reports cough, nasal congestion, headache, sore throat, body aches, and chills that started two days ago. Cough is productive with thick, green sputum that mostly happens in the morning. Reports coughing a lot at night that keeps up awake. Reports sinus pressure and is using neti pot for relief. She is being seen by ENT for small sinus issues and will be having surgery to correct in the near future. Patient denies rhinorrhea, fever, and ear pain/pressure. She has taken Mucinex, Dayquil, and ibuprofen with little relief. She reports cases of Covid in her workplace, but no close contacts.       Past Medical History:   Diagnosis Date    Allergic     Hayfever    Crohn's colitis        Past Surgical History:   Procedure Laterality Date    ABSCESS DRAINAGE       SECTION  21    COLONOSCOPY  10/2022    DENTAL PROCEDURE         Social History     Socioeconomic History    Marital status:    Tobacco Use    Smoking status: Never    Smokeless tobacco: Never   Vaping Use    Vaping status: Never Used   Substance and Sexual Activity    Alcohol use: Yes     Alcohol/week: 3.0 standard drinks of alcohol     Types: 3 Cans of beer per week     Comment: 3 drinks per week    Drug use: Never    Sexual activity: Yes     Partners: Male     Birth control/protection: Condom         Current  "Outpatient Medications:     ascorbic acid (VITAMIN C) 1000 MG tablet, Take 1 tablet by mouth Daily., Disp: , Rfl:     fluticasone (FLONASE) 50 MCG/ACT nasal spray, SPRAY 1 SPRAY INTO EACH NOSTRIL EVERY DAY FOR 30 DAYS, Disp: , Rfl:     ibuprofen (ADVIL,MOTRIN) 200 MG tablet, , Disp: , Rfl:     Multiple Vitamins-Minerals (MULTIVITAMIN ADULT, MINERALS, PO), , Disp: , Rfl:     Probiotic Product (Probiotic-10) chewable tablet, , Disp: , Rfl:     Pseudoephedrine HCl (SUDAFED 12 HOUR PO), , Disp: , Rfl:     fluconazole (Diflucan) 150 MG tablet, Take 1 tablet by mouth Every 72 (Seventy-Two) Hours., Disp: 2 tablet, Rfl: 0    ipratropium (ATROVENT) 0.03 % nasal spray, Administer 2 sprays into the nostril(s) as directed by provider Every 12 (Twelve) Hours for 60 days., Disp: 120 mL, Rfl: 1    promethazine-dextromethorphan (PROMETHAZINE-DM) 6.25-15 MG/5ML syrup, Take 5 mL by mouth At Night As Needed for Cough., Disp: 120 mL, Rfl: 0    Objective     Vital Signs  /72   Pulse 68   Temp 97.6 °F (36.4 °C)   Ht 165.1 cm (65\")   Wt 93.8 kg (206 lb 12.8 oz)   SpO2 98%   BMI 34.41 kg/m²   Estimated body mass index is 34.41 kg/m² as calculated from the following:    Height as of this encounter: 165.1 cm (65\").    Weight as of this encounter: 93.8 kg (206 lb 12.8 oz).           Physical Exam  Vitals and nursing note reviewed.   Constitutional:       General: She is not in acute distress.     Appearance: Normal appearance.   HENT:      Head: Normocephalic.      Right Ear: Hearing, tympanic membrane, ear canal and external ear normal.      Left Ear: Hearing, ear canal and external ear normal. Tympanic membrane is erythematous.      Nose: Nose normal.      Right Sinus: Maxillary sinus tenderness present. No frontal sinus tenderness.      Left Sinus: Maxillary sinus tenderness present. No frontal sinus tenderness.      Mouth/Throat:      Lips: Pink.      Mouth: Mucous membranes are moist.      Pharynx: Uvula midline. Posterior " oropharyngeal erythema present.      Tonsils: No tonsillar exudate.   Cardiovascular:      Rate and Rhythm: Normal rate and regular rhythm.      Heart sounds: Normal heart sounds.   Pulmonary:      Effort: Pulmonary effort is normal.      Breath sounds: Normal breath sounds.   Lymphadenopathy:      Cervical: No cervical adenopathy.   Skin:     General: Skin is warm and dry.   Neurological:      Mental Status: She is alert and oriented to person, place, and time.   Psychiatric:         Mood and Affect: Mood and affect normal.         Behavior: Behavior is cooperative.          Assessment and Plan     Diagnoses and all orders for this visit:    1. Acute URI (Primary)  -     amoxicillin-clavulanate (AUGMENTIN) 875-125 MG per tablet; Take 1 tablet by mouth 2 (Two) Times a Day for 7 days.  Dispense: 14 tablet; Refill: 0    2. Sore throat  -     POCT SARS-CoV-2 Antigen ANITRA + Flu  -     POCT rapid strep A    3. Acute cough  -     POCT SARS-CoV-2 Antigen ANITRA + Flu  -     POCT rapid strep A  -     promethazine-dextromethorphan (PROMETHAZINE-DM) 6.25-15 MG/5ML syrup; Take 5 mL by mouth At Night As Needed for Cough.  Dispense: 120 mL; Refill: 0    4. Nasal congestion  -     POCT SARS-CoV-2 Antigen ANITRA + Flu    5. Body aches  -     POCT SARS-CoV-2 Antigen ANITRA + Flu  -     POCT rapid strep A    6. Antibiotic-induced yeast infection  -     fluconazole (Diflucan) 150 MG tablet; Take 1 tablet by mouth Every 72 (Seventy-Two) Hours.  Dispense: 2 tablet; Refill: 0    Plan:  Patient tested in office today for covid, flu, and strep. Covid and flu test were negative. Strep was positive.   Encouraged increased water intake.  Encouraged to continue using OTC remedies as needed.   Work excuse given for today and tomorrow.   Prescription for Augmentin to take twice daily for 7 days sent to the pharmacy on file.  Promethazine DM sent to the pharmacy on file to assist with cough and congestion.  Return to clinic if symptoms worsen or fail to  improve.      Follow Up  Return if symptoms worsen or fail to improve.      NICOLE Fernandez    Agree with assessment, documentation, and plan of care formulated by NICOLE Quevedo student.    Part of this note may be an electronic transcription/translation of spoken language to printed text using the Dragon Dictation System.

## 2025-06-09 ENCOUNTER — OFFICE VISIT (OUTPATIENT)
Dept: INTERNAL MEDICINE | Facility: CLINIC | Age: 40
End: 2025-06-09
Payer: COMMERCIAL

## 2025-06-09 VITALS
DIASTOLIC BLOOD PRESSURE: 82 MMHG | BODY MASS INDEX: 34.12 KG/M2 | OXYGEN SATURATION: 95 % | HEIGHT: 65 IN | HEART RATE: 67 BPM | WEIGHT: 204.8 LBS | SYSTOLIC BLOOD PRESSURE: 116 MMHG

## 2025-06-09 DIAGNOSIS — R92.30 DENSE BREAST TISSUE: ICD-10-CM

## 2025-06-09 DIAGNOSIS — N64.4 PAIN OF RIGHT BREAST: Primary | ICD-10-CM

## 2025-06-09 DIAGNOSIS — Z80.3 FAMILY HISTORY OF BREAST CANCER IN FEMALE: ICD-10-CM

## 2025-06-09 DIAGNOSIS — Z80.3 FAMILY HISTORY OF BREAST CANCER IN FIRST DEGREE RELATIVE: ICD-10-CM

## 2025-06-09 PROCEDURE — 99213 OFFICE O/P EST LOW 20 MIN: CPT | Performed by: NURSE PRACTITIONER

## 2025-06-09 RX ORDER — RIBOFLAVIN (VITAMIN B2) 100 MG
TABLET ORAL DAILY
COMMUNITY
End: 2025-06-09

## 2025-06-09 NOTE — PROGRESS NOTES
Office Note     Name: Maria Luisa Quintero    : 1985     MRN: [id number]     Chief Complaint  Breast Pain (Right. Started about 2 weeks ago)    Subjective     History of Present Illness:  Maria Luisa Quintero is a 39 y.o. female     History of Present Illness  The patient is a 39-year-old female presenting for evaluation of right breast pain.    She has been experiencing persistent pain in her right breast for over a week, which she describes as more intense than the usual discomfort associated with her menstrual cycle. The pain is exacerbated when she is not wearing a bra or when water from the shower hits the area. The pain is primarily located underneath the breast, but the entire breast is affected. She reports no recent injury, strain, or heavy lifting that could have caused the pain. She has a history of fibrocystic breasts, which makes it challenging for her to detect any abnormalities. She is aware that she is due for a mammogram. She has attempted self-breast examinations but finds them painful. She has not detected any lumps or bumps in her breasts and reports no redness, heat, or changes in tissue.     She has a strong family history of breast cancer and has undergone genetic counseling, which revealed that she does not carry the BRCA gene. She underwent a bilateral diagnostic mammogram last year due to some concerns, but the current pain is significantly worse. A cyst was identified on the left side during her previous mammogram.    FAMILY HISTORY  She has a strong family history of breast cancer.      Past Medical History:   Diagnosis Date    Allergic     Hayfever    Crohn's colitis        Past Surgical History:   Procedure Laterality Date    ABSCESS DRAINAGE       SECTION  21    COLONOSCOPY  10/2022    DENTAL PROCEDURE         Social History     Socioeconomic History    Marital status:    Tobacco Use    Smoking status: Never    Smokeless tobacco: Never   Vaping Use    Vaping  "status: Never Used   Substance and Sexual Activity    Alcohol use: Yes     Alcohol/week: 3.0 standard drinks of alcohol     Types: 3 Cans of beer per week     Comment: 3 drinks per week    Drug use: Never    Sexual activity: Yes     Partners: Male     Birth control/protection: Condom         Current Outpatient Medications:     ascorbic acid (VITAMIN C) 1000 MG tablet, Take 1 tablet by mouth Daily., Disp: , Rfl:     fluticasone (FLONASE) 50 MCG/ACT nasal spray, SPRAY 1 SPRAY INTO EACH NOSTRIL EVERY DAY FOR 30 DAYS, Disp: , Rfl:     ibuprofen (ADVIL,MOTRIN) 200 MG tablet, , Disp: , Rfl:     Multiple Vitamins-Minerals (MULTIVITAMIN ADULT, MINERALS, PO), , Disp: , Rfl:     Probiotic Product (Probiotic-10) chewable tablet, , Disp: , Rfl:     ipratropium (ATROVENT) 0.03 % nasal spray, Administer 2 sprays into the nostril(s) as directed by provider Every 12 (Twelve) Hours for 60 days., Disp: 120 mL, Rfl: 1    promethazine-dextromethorphan (PROMETHAZINE-DM) 6.25-15 MG/5ML syrup, Take 5 mL by mouth At Night As Needed for Cough. (Patient not taking: Reported on 6/9/2025), Disp: 120 mL, Rfl: 0    Pseudoephedrine HCl (SUDAFED 12 HOUR PO), , Disp: , Rfl:     Objective     Vital Signs  /82   Pulse 67   Ht 165.1 cm (65\")   Wt 92.9 kg (204 lb 12.8 oz)   SpO2 95%   BMI 34.08 kg/m²   Estimated body mass index is 34.08 kg/m² as calculated from the following:    Height as of this encounter: 165.1 cm (65\").    Weight as of this encounter: 92.9 kg (204 lb 12.8 oz).           Physical Exam  Constitutional:       General: She is not in acute distress.     Appearance: Normal appearance. She is not ill-appearing.   HENT:      Head: Normocephalic and atraumatic.      Nose: Nose normal.   Eyes:      Extraocular Movements: Extraocular movements intact.      Conjunctiva/sclera: Conjunctivae normal.      Pupils: Pupils are equal, round, and reactive to light.   Cardiovascular:      Rate and Rhythm: Normal rate.   Pulmonary:      " Effort: Pulmonary effort is normal. No respiratory distress.   Chest:   Breasts:     Right: Tenderness present. No swelling, mass or skin change.       Musculoskeletal:         General: Normal range of motion.      Cervical back: Neck supple.   Skin:     General: Skin is warm and dry.   Neurological:      General: No focal deficit present.      Mental Status: She is alert and oriented to person, place, and time. Mental status is at baseline.   Psychiatric:         Mood and Affect: Mood normal.         Behavior: Behavior normal.         Thought Content: Thought content normal.         Judgment: Judgment normal.          Assessment and Plan     Diagnoses and all orders for this visit:    1. Pain of right breast (Primary)  -     Mammo Diagnostic Digital Tomosynthesis Bilateral With CAD; Future    2. Family history of breast cancer in female  -     Mammo Diagnostic Digital Tomosynthesis Bilateral With CAD; Future    3. Family history of breast cancer in first degree relative  -     Mammo Diagnostic Digital Tomosynthesis Bilateral With CAD; Future    4. Dense breast tissue  -     Mammo Diagnostic Digital Tomosynthesis Bilateral With CAD; Future        Assessment & Plan  1. Right breast pain.  - The patient reports right breast pain persisting for over a week, exacerbated by activities such as running and showering.  - The pain is more pronounced underneath the breast and is reproducible upon touch. There is no history of recent injury or strain.  - Given her history of fibrocystic breasts and a previous cyst on the left side, a diagnostic mammogram has been ordered to rule out any abnormalities.  - If the mammogram results are normal, costochondritis will be considered as a potential cause, and anti-inflammatory medications may be recommended.      Follow Up  Return if symptoms worsen or fail to improve, for Next scheduled follow up.    Patient or patient representative verbalized consent for the use of Ambient Listening  during the visit with  NICOLE Fernandez for chart documentation. 6/9/2025  15:34 EDT      NICOLE Fernandez    Part of this note may be an electronic transcription/translation of spoken language to printed text using the Dragon Dictation System.

## 2025-06-18 ENCOUNTER — RESULTS FOLLOW-UP (OUTPATIENT)
Facility: HOSPITAL | Age: 40
End: 2025-06-18
Payer: COMMERCIAL

## 2025-06-18 LAB
NCCN CRITERIA FLAG: ABNORMAL
TYRER CUZICK SCORE: 33

## 2025-06-18 NOTE — PROGRESS NOTES
The patient underwent genetic counseling and genetic testing in 2023. The CancerNext panel was ordered through [employer] which analyzes 36 genes associated with cancer risk. Genetic testing was negative for pathogenic mutations in all 36 cancer risk genes analyzed. No additional genetic testing.   Patient was referred to the [facility] at that time but an appointment was never made. A navigator will be in contact with the patient to re-discuss her Tyrer-Cuzick score.

## 2025-06-23 ENCOUNTER — HOSPITAL ENCOUNTER (OUTPATIENT)
Facility: HOSPITAL | Age: 40
Discharge: HOME | End: 2025-06-23
Payer: COMMERCIAL

## 2025-06-23 DIAGNOSIS — Z80.3 FAMILY HISTORY OF BREAST CANCER IN FIRST DEGREE RELATIVE: ICD-10-CM

## 2025-06-23 DIAGNOSIS — N64.4 PAIN OF RIGHT BREAST: ICD-10-CM

## 2025-06-23 DIAGNOSIS — Z80.3 FAMILY HISTORY OF BREAST CANCER IN FEMALE: ICD-10-CM

## 2025-06-23 DIAGNOSIS — R92.30 DENSE BREAST TISSUE: ICD-10-CM

## 2025-06-23 PROCEDURE — 77066 DX MAMMO INCL CAD BI: CPT

## 2025-06-23 PROCEDURE — G0279 TOMOSYNTHESIS, MAMMO: HCPCS

## 2025-06-23 PROCEDURE — 76642 ULTRASOUND BREAST LIMITED: CPT

## 2025-06-23 PROCEDURE — 77062 BREAST TOMOSYNTHESIS BI: CPT | Performed by: RADIOLOGY

## 2025-06-23 PROCEDURE — 76642 ULTRASOUND BREAST LIMITED: CPT | Performed by: RADIOLOGY

## 2025-06-23 PROCEDURE — 77066 DX MAMMO INCL CAD BI: CPT | Performed by: RADIOLOGY

## 2025-06-30 NOTE — PROGRESS NOTES
I left a voice mail and previously sent a [employer] message requesting a return call to discuss risk assessment results.

## 2025-07-09 DIAGNOSIS — Z91.89 AT HIGH RISK FOR BREAST CANCER: Primary | ICD-10-CM

## 2025-07-09 NOTE — PROGRESS NOTES
This patient recently completed the CARE risk assessment for a mammogram appointment. Based on the responses, the patient meets NCCN criteria for genetic testing and the Tyrer-Cuzick breast cancer risk score is > 20. At the time of the assessment, the patient was provided with both written and video educational materials regarding genetic testing.     Navigator follow-up:    The patient underwent genetic counseling and genetic testing in 2023. The CancerNext panel was ordered through [employer] which analyzes 36 genes associated with cancer risk. Genetic testing was negative for pathogenic mutations in all 36 cancer risk genes analyzed. No additional genetic testing is indicated at this time.     I spoke with the patient about the recommendations and options for risk management for elevated Tyrer-Cuzick risk scores. The patient is interested in services provided by the [facility]Risk Scre[facility] Prevention [facility]. A referral request has been submitted to the ordering provider.

## 2025-07-09 NOTE — PROGRESS NOTES
I left a voice mail and previously sent a [employer] message requesting a return call to discuss risk assessment results.

## 2025-07-31 ENCOUNTER — LAB REQUISITION (OUTPATIENT)
Dept: LAB | Facility: HOSPITAL | Age: 40
End: 2025-07-31
Payer: COMMERCIAL

## 2025-07-31 DIAGNOSIS — Z01.818 ENCOUNTER FOR OTHER PREPROCEDURAL EXAMINATION: ICD-10-CM

## 2025-07-31 DIAGNOSIS — Z01.812 ENCOUNTER FOR PREPROCEDURAL LABORATORY EXAMINATION: ICD-10-CM

## 2025-07-31 LAB — HCG INTACT+B SERPL-ACNC: <1 MIU/ML

## 2025-07-31 PROCEDURE — 84702 CHORIONIC GONADOTROPIN TEST: CPT | Performed by: OTOLARYNGOLOGY

## 2025-07-31 PROCEDURE — 88305 TISSUE EXAM BY PATHOLOGIST: CPT | Performed by: OTOLARYNGOLOGY

## 2025-07-31 PROCEDURE — 88311 DECALCIFY TISSUE: CPT | Performed by: OTOLARYNGOLOGY

## 2025-08-01 ENCOUNTER — LAB REQUISITION (OUTPATIENT)
Dept: LAB | Facility: HOSPITAL | Age: 40
End: 2025-08-01
Payer: COMMERCIAL

## 2025-08-01 DIAGNOSIS — J32.9 CHRONIC SINUSITIS, UNSPECIFIED: ICD-10-CM
